# Patient Record
Sex: MALE | Race: OTHER | HISPANIC OR LATINO | URBAN - METROPOLITAN AREA
[De-identification: names, ages, dates, MRNs, and addresses within clinical notes are randomized per-mention and may not be internally consistent; named-entity substitution may affect disease eponyms.]

---

## 2020-01-10 ENCOUNTER — EMERGENCY (EMERGENCY)
Facility: HOSPITAL | Age: 35
LOS: 1 days | Discharge: HOME | End: 2020-01-10
Attending: EMERGENCY MEDICINE
Payer: MEDICAID

## 2020-01-10 VITALS
RESPIRATION RATE: 22 BRPM | DIASTOLIC BLOOD PRESSURE: 72 MMHG | WEIGHT: 315 LBS | HEIGHT: 69 IN | OXYGEN SATURATION: 91 % | TEMPERATURE: 99 F | SYSTOLIC BLOOD PRESSURE: 103 MMHG | HEART RATE: 106 BPM

## 2020-01-10 LAB
ALBUMIN SERPL ELPH-MCNC: 3.5 G/DL — SIGNIFICANT CHANGE UP (ref 3.5–5)
ALP SERPL-CCNC: 64 U/L — SIGNIFICANT CHANGE UP (ref 40–120)
ALT FLD-CCNC: 40 U/L DA — SIGNIFICANT CHANGE UP (ref 10–60)
ANION GAP SERPL CALC-SCNC: 7 MMOL/L — SIGNIFICANT CHANGE UP (ref 5–17)
APPEARANCE UR: CLEAR — SIGNIFICANT CHANGE UP
AST SERPL-CCNC: 65 U/L — HIGH (ref 10–40)
BASOPHILS # BLD AUTO: 0 K/UL — SIGNIFICANT CHANGE UP (ref 0–0.2)
BASOPHILS NFR BLD AUTO: 0 % — SIGNIFICANT CHANGE UP (ref 0–2)
BILIRUB SERPL-MCNC: 0.3 MG/DL — SIGNIFICANT CHANGE UP (ref 0.2–1.2)
BILIRUB UR-MCNC: NEGATIVE — SIGNIFICANT CHANGE UP
BUN SERPL-MCNC: 15 MG/DL — SIGNIFICANT CHANGE UP (ref 7–18)
CALCIUM SERPL-MCNC: 8.9 MG/DL — SIGNIFICANT CHANGE UP (ref 8.4–10.5)
CHLORIDE SERPL-SCNC: 105 MMOL/L — SIGNIFICANT CHANGE UP (ref 96–108)
CO2 SERPL-SCNC: 24 MMOL/L — SIGNIFICANT CHANGE UP (ref 22–31)
COLOR SPEC: YELLOW — SIGNIFICANT CHANGE UP
CREAT SERPL-MCNC: 1.13 MG/DL — SIGNIFICANT CHANGE UP (ref 0.5–1.3)
DIFF PNL FLD: ABNORMAL
EOSINOPHIL # BLD AUTO: 0 K/UL — SIGNIFICANT CHANGE UP (ref 0–0.5)
EOSINOPHIL NFR BLD AUTO: 0 % — SIGNIFICANT CHANGE UP (ref 0–6)
GLUCOSE SERPL-MCNC: 117 MG/DL — HIGH (ref 70–99)
GLUCOSE UR QL: NEGATIVE — SIGNIFICANT CHANGE UP
HCT VFR BLD CALC: 49.1 % — SIGNIFICANT CHANGE UP (ref 39–50)
HGB BLD-MCNC: 16.1 G/DL — SIGNIFICANT CHANGE UP (ref 13–17)
KETONES UR-MCNC: ABNORMAL
LEUKOCYTE ESTERASE UR-ACNC: ABNORMAL
LIDOCAIN IGE QN: 129 U/L — SIGNIFICANT CHANGE UP (ref 73–393)
LYMPHOCYTES # BLD AUTO: 0.71 K/UL — LOW (ref 1–3.3)
LYMPHOCYTES # BLD AUTO: 6 % — LOW (ref 13–44)
MCHC RBC-ENTMCNC: 29.1 PG — SIGNIFICANT CHANGE UP (ref 27–34)
MCHC RBC-ENTMCNC: 32.8 GM/DL — SIGNIFICANT CHANGE UP (ref 32–36)
MCV RBC AUTO: 88.8 FL — SIGNIFICANT CHANGE UP (ref 80–100)
MONOCYTES # BLD AUTO: 0.47 K/UL — SIGNIFICANT CHANGE UP (ref 0–0.9)
MONOCYTES NFR BLD AUTO: 4 % — SIGNIFICANT CHANGE UP (ref 2–14)
NEUTROPHILS # BLD AUTO: 10.62 K/UL — HIGH (ref 1.8–7.4)
NEUTROPHILS NFR BLD AUTO: 85 % — HIGH (ref 43–77)
NITRITE UR-MCNC: NEGATIVE — SIGNIFICANT CHANGE UP
PH UR: 6.5 — SIGNIFICANT CHANGE UP (ref 5–8)
PLATELET # BLD AUTO: 264 K/UL — SIGNIFICANT CHANGE UP (ref 150–400)
POTASSIUM SERPL-MCNC: 4.1 MMOL/L — SIGNIFICANT CHANGE UP (ref 3.5–5.3)
POTASSIUM SERPL-SCNC: 4.1 MMOL/L — SIGNIFICANT CHANGE UP (ref 3.5–5.3)
PROT SERPL-MCNC: 8.5 G/DL — HIGH (ref 6–8.3)
PROT UR-MCNC: 100
RBC # BLD: 5.53 M/UL — SIGNIFICANT CHANGE UP (ref 4.2–5.8)
RBC # FLD: 14.7 % — HIGH (ref 10.3–14.5)
SODIUM SERPL-SCNC: 136 MMOL/L — SIGNIFICANT CHANGE UP (ref 135–145)
SP GR SPEC: 1.01 — SIGNIFICANT CHANGE UP (ref 1.01–1.02)
UROBILINOGEN FLD QL: NEGATIVE — SIGNIFICANT CHANGE UP
WBC # BLD: 11.8 K/UL — HIGH (ref 3.8–10.5)
WBC # FLD AUTO: 11.8 K/UL — HIGH (ref 3.8–10.5)

## 2020-01-10 PROCEDURE — 96374 THER/PROPH/DIAG INJ IV PUSH: CPT | Mod: XU

## 2020-01-10 PROCEDURE — 74177 CT ABD & PELVIS W/CONTRAST: CPT

## 2020-01-10 PROCEDURE — 99284 EMERGENCY DEPT VISIT MOD MDM: CPT

## 2020-01-10 PROCEDURE — 71046 X-RAY EXAM CHEST 2 VIEWS: CPT | Mod: 26

## 2020-01-10 PROCEDURE — 81001 URINALYSIS AUTO W/SCOPE: CPT

## 2020-01-10 PROCEDURE — 80053 COMPREHEN METABOLIC PANEL: CPT

## 2020-01-10 PROCEDURE — 71046 X-RAY EXAM CHEST 2 VIEWS: CPT

## 2020-01-10 PROCEDURE — 74177 CT ABD & PELVIS W/CONTRAST: CPT | Mod: 26

## 2020-01-10 PROCEDURE — 96375 TX/PRO/DX INJ NEW DRUG ADDON: CPT

## 2020-01-10 PROCEDURE — 85027 COMPLETE CBC AUTOMATED: CPT

## 2020-01-10 PROCEDURE — 99284 EMERGENCY DEPT VISIT MOD MDM: CPT | Mod: 25

## 2020-01-10 PROCEDURE — 83690 ASSAY OF LIPASE: CPT

## 2020-01-10 PROCEDURE — 36415 COLL VENOUS BLD VENIPUNCTURE: CPT

## 2020-01-10 RX ORDER — ONDANSETRON 8 MG/1
4 TABLET, FILM COATED ORAL ONCE
Refills: 0 | Status: COMPLETED | OUTPATIENT
Start: 2020-01-10 | End: 2020-01-10

## 2020-01-10 RX ORDER — SODIUM CHLORIDE 9 MG/ML
1000 INJECTION INTRAMUSCULAR; INTRAVENOUS; SUBCUTANEOUS ONCE
Refills: 0 | Status: COMPLETED | OUTPATIENT
Start: 2020-01-10 | End: 2020-01-10

## 2020-01-10 RX ADMIN — ONDANSETRON 4 MILLIGRAM(S): 8 TABLET, FILM COATED ORAL at 13:14

## 2020-01-10 RX ADMIN — SODIUM CHLORIDE 1000 MILLILITER(S): 9 INJECTION INTRAMUSCULAR; INTRAVENOUS; SUBCUTANEOUS at 13:15

## 2020-01-10 NOTE — ED ADULT NURSE NOTE - OBJECTIVE STATEMENT
Patient came to the ED a/o x 3 ambulates c/o nausea with diarrhea. Pt was recently dx and treated with flu.

## 2020-01-10 NOTE — ED PROVIDER NOTE - PATIENT PORTAL LINK FT
You can access the FollowMyHealth Patient Portal offered by [facility] by registering at the following website: [email or web address]. By joining [employer]’s FollowMyHealth portal, you will also be able to view your health information using other applications (apps) compatible with our system.

## 2020-01-10 NOTE — ED ADULT TRIAGE NOTE - CHIEF COMPLAINT QUOTE
Fever, nausea, diarrhea x Sunday. Seen by PMD, on Antibiotics, but not feeling better. Fever, nausea, diarrhea x Sunday. Seen by PMD, but not feeling better.

## 2020-01-10 NOTE — ED ADULT TRIAGE NOTE - ESI TRIAGE ACUITY LEVEL, MLM
CC'd Chart Routing History     Routing History     From: Masha Do MD On: 06/10/2019 11:02 AM  To: Ernestina MCKAY Staff ([place])  Priority: Routine  Routing Comments:  LOGAN pt has a few IVIG doses schedule at home over next month, last dose is planned July 24th; after that, would like to plan for IVIG every 28 day (4 weeks) dose of 1 gram /KG total dose monthly for next 12 months;  Bioscripts; thanks         2

## 2020-01-10 NOTE — ED PROVIDER NOTE - OBJECTIVE STATEMENT
Pt is a 34y M with no significant PMHx and no significant PSHx presenting to the ED with fever, nausea and diarrhea. Pt reports symptoms first began with fever and cough. He went to see his pmd on 1/7 and was prescribed Tamiflu along with other medication for supportive care. Pt states over the past 2 days, symptoms have worsened thus coming to the ED for further evaluation. Pt notes persistent fever and now additionally has nausea and diarrhea. Pt denies any vomiting, urinary symptoms or any bloody diarrhea. Pt has NKDA and currently denies any additional complaints at this time.

## 2020-01-10 NOTE — ED PROVIDER NOTE - CLINICAL SUMMARY MEDICAL DECISION MAKING FREE TEXT BOX
Pt being treated Tamiflu with flu like symptoms now with nausea and diarrhea. R/o colitis, provide supportive care and reassess.

## 2021-11-02 NOTE — ED ADULT NURSE NOTE - NSFALLRSKASSESSTYPE_ED_ALL_ED
[facility] Employee Diabetes Program    Hugo Martinez is a 65 y.o. male enrolled in the [facility] Employee Diabetes Program. The goal of this voluntary program is to help employees and covered dependents reach their health maintenance goals in regards to their diabetes diagnosis. According to our records, patient is missing the following requirement(s) that must be completed by December 31, 2021 to avoid discharge from the program:     Second diabetes visit with your physician in 2021   First AND Second A1c result in 2021   Lipid panel   Urine protein/microalbumin   Pneumococcal vaccination   Influenza vaccination for the 2021    Plan:  Attempt made to reach patient by telephone to review above.  Left voice message for patient to return clinician's phone call to [phone], option 3. letter    Warner Ramirez, PharmD, [facility] [address] Pharmacist  Department: [phone]  ===================================  For Pharmacy Admin Tracking Only     Time Spent (min): 10
Initial (On Arrival)

## 2021-11-09 PROBLEM — Z00.00 ENCOUNTER FOR PREVENTIVE HEALTH EXAMINATION: Status: ACTIVE | Noted: 2021-11-09

## 2021-11-10 ENCOUNTER — APPOINTMENT (OUTPATIENT)
Dept: SURGERY | Facility: CLINIC | Age: 36
End: 2021-11-10
Payer: MEDICAID

## 2021-11-10 VITALS — TEMPERATURE: 96.8 F

## 2021-11-10 VITALS
WEIGHT: 315 LBS | HEART RATE: 93 BPM | SYSTOLIC BLOOD PRESSURE: 147 MMHG | DIASTOLIC BLOOD PRESSURE: 84 MMHG | BODY MASS INDEX: 46.65 KG/M2 | HEIGHT: 69 IN

## 2021-11-10 DIAGNOSIS — G47.30 SLEEP APNEA, UNSPECIFIED: ICD-10-CM

## 2021-11-10 DIAGNOSIS — F32.A DEPRESSION, UNSPECIFIED: ICD-10-CM

## 2021-11-10 DIAGNOSIS — R73.03 PREDIABETES.: ICD-10-CM

## 2021-11-10 DIAGNOSIS — J45.909 UNSPECIFIED ASTHMA, UNCOMPLICATED: ICD-10-CM

## 2021-11-10 DIAGNOSIS — F41.9 ANXIETY DISORDER, UNSPECIFIED: ICD-10-CM

## 2021-11-10 DIAGNOSIS — Z78.9 OTHER SPECIFIED HEALTH STATUS: ICD-10-CM

## 2021-11-10 PROCEDURE — 99204 OFFICE O/P NEW MOD 45 MIN: CPT

## 2021-11-10 NOTE — HISTORY OF PRESENT ILLNESS
[de-identified] : Mr. SHAUNA SILVA  presents today  for bariatric surgery consultation. he has a long-standing history of severe obesity refractory to multiple prior attempts at weight loss including conservative treatments of diet and exercise.  Mr. SHAUNA SILVA has been obese since childhood, and the most weight that he  has been able to lose by any means is negligible.   Previous weight loss efforts include: Calorie-counting, restricted intake. Mr. SILVA has limited capacity for exercise due to excess weight.  he states that he was on the diet between 2016 and 2019 and was able to go down to 298 lbs but with COVID pandemic he regained his weigh kali. he states that he initiated the process with another bariatric  surgeon 2 years ago but had issues with his medical insurance and could not have the surgery.  he  feels that weight loss surgery is the only option at this point for effective and clinically meaningful weight loss. Patient states that he has sleep apnea . he denies being evaluated  or having sleep study. he  is not sure what procedure he wants  yet.

## 2021-11-10 NOTE — PLAN
[FreeTextEntry1] : \par I have had a lengthy conversation with the patient and have discussed my impression and treatment plan with the patient.  \par The risks, benefits and alternatives, including laparoscopic gastric bypass, and laparoscopic vertical sleeve gastrectomy, were discussed at length and all of his questions were answered. The patient appears to understand and wishes to proceed.\par \par The patient was given the following instructions:\par \par 1.	Patient needs a complete medical evaluation including echocardiogram, stress test, pulmonary function test and evaluation for sleep apnea.\par \par 2.	Patient needs evaluation by GI including an upper endoscopy.\par \par 3.	Patient needs nutritional and psychological evaluations.\par \par 4.	Patient must attend a preoperative information meeting.\par \par 5.	Patient must undergo a right upper quadrant ultrasound, if there is no history of prior cholecystectomy.\par \par ·	Screening for Obstructive Sleep apnea (performed today).  Patient does meet criteria for polysomnography testing\par ·	Functional Assessment: Completely independent\par ·	Smoking:  Patient does not smoke.  Smoking cessation counseling/resources discussed (if indicated)\par ·	Substance Abuse:  Patient does not report use of illicit substances.  Counseling/resources discussed (if indicated)\par \par \par The weight loss surgery information packet as well as the nutrition education packet have been reviewed and given to the patient, and I provided and reviewed detailed documents addressing these same topics. I have provided literature about the procedures and encouraged the patient to further research the surgeries, and patient feels well informed.   I also provided patient with detailed information regarding the pre-operative requirements with respect to testing, medical, nutritional and psychological evaluations and documentation of same.  \par \par Also discussed was importance of taking supplements and attending regular follow-up.  I reviewed importance of behavioral modification and follow-up in order to optimize outcomes and avoid complications. The patient is aware of the expected length of stay and discharge plan for a sleeve gastrectomy.  I encouraged the patient to maintain a diet and exercise program to promote optimum health for the surgical procedure and post-op course. \par \par The patient clearly understood that surgery would only be scheduled if there are no medical or psychiatric contraindications and that follow up pre-operative office visits are required.  Patient agrees to begin a multi-disciplinary evaluation as discussed and provide required documentation and progress.  I informed patient that once all testing and evaluations (as deemed necessary) are completed, reviewed, and documentation received, this information to justify medical necessity for weight loss surgery will be submitted to insurance for pre-certification.  \par \par Patient will begin the pre-operative process with a visit to PCP, for whom detailed information regarding the necessary evaluations and documentation of obesity-related medical care was given to patient to share with PCP.  \par \par The patient had the opportunity to ask pertinent questions which were answered.  All of patient’s questions and concerns were addressed to patient’s satisfaction, and patient verbalized an understanding of the information discussed.\par

## 2021-11-10 NOTE — REVIEW OF SYSTEMS
[Recent Change In Weight] : ~T recent weight change [Negative] : Allergic/Immunologic [Patient Intake Form Reviewed] : Patient intake form was reviewed [SOB on Exertion] : shortness of breath during exertion [Fever] : no fever [Chills] : no chills [Fatigue] : no fatigue [Vision Problems] : no vision problems [Dysphagia] : no dysphagia [Odynophagia] : no odynophagia [Chest Pain] : no chest pain [Palpitations] : no palpitations [Shortness Of Breath] : no shortness of breath [Wheezing] : no wheezing [Cough] : no cough [Abdominal Pain] : no abdominal pain [Vomiting] : no vomiting [Constipation] : no constipation [Reflux/Heartburn] : no reflex/heartburn [Easy Bleeding] : no tendency for easy bleeding [Easy Bruising] : no tendency for easy bruising

## 2021-11-10 NOTE — PHYSICAL EXAM
[Obese, well nourished, in no acute distress] : obese, well nourished, in no acute distress [Normal] : affect appropriate [de-identified] : Obese, protuberant. Soft, nontender, nondistended, no rebound or guarding. [de-identified] : skin rash on forehead

## 2021-11-10 NOTE — CONSULT LETTER
[Dear  ___] : Dear  [unfilled], [Consult Letter:] : I had the pleasure of evaluating your patient, [unfilled]. [Please see my note below.] : Please see my note below. [Consult Closing:] : Thank you very much for allowing me to participate in the care of this patient.  If you have any questions, please do not hesitate to contact me. [Sincerely,] : Sincerely, [FreeTextEntry3] : Daniel

## 2021-11-29 ENCOUNTER — APPOINTMENT (OUTPATIENT)
Dept: GASTROENTEROLOGY | Facility: CLINIC | Age: 36
End: 2021-11-29

## 2021-12-28 NOTE — ASSESSMENT
[FreeTextEntry1] : \par Patient with a BMI of * which places patient in the morbidly obese category.  Patient also suffers from ***  .  This has directly contributed to patient's  current medical conditions as well as, a decreased quality of life.  Patient has very little chance of successfully losing and maintaining a significant amount of weight with non-surgical management, and would benefit from surgical intervention.  Patient meets the criteria for weight loss surgery as defined in the NIH consensus statement, surgery is medically necessary. \par Given patient’s current BMI and obesity-related comorbidities, I feel the patient is a candidate for and would benefit from weight loss surgery, as all prior attempts by non-surgical means have been futile.\par \par VTE Risk Calculation:\par \par Does patient have PERSONAL history of VTE? n\par Does patient have PERSONAL history of myocardial infarction, cardiac stent, or acute coronary syndrome? n\par \par Does patient have FAMILY history of VTE? n\par Does patient have FAMILY history of myocardial infarction, cardiac stent, or acute coronary syndrome? n\par \par \par Screening for Sleep Apnea:\par Nightly snoring:  y\par Witnessed apnea:   ?\par Nocturnal gasping:  ?\par Morning dry mouth:  y\par Feeling unrefreshed on awakening from sleep:  y\par Excessive daytime sleepiness:  y\par \par . 
Statement Selected

## 2022-01-11 ENCOUNTER — APPOINTMENT (OUTPATIENT)
Dept: CARDIOLOGY | Facility: CLINIC | Age: 37
End: 2022-01-11
Payer: MEDICAID

## 2022-01-11 VITALS
BODY MASS INDEX: 46.65 KG/M2 | HEIGHT: 69 IN | SYSTOLIC BLOOD PRESSURE: 137 MMHG | WEIGHT: 315 LBS | DIASTOLIC BLOOD PRESSURE: 82 MMHG | TEMPERATURE: 97.9 F | OXYGEN SATURATION: 87 % | HEART RATE: 99 BPM

## 2022-01-11 DIAGNOSIS — Z72.0 TOBACCO USE: ICD-10-CM

## 2022-01-11 PROCEDURE — 99214 OFFICE O/P EST MOD 30 MIN: CPT

## 2022-01-11 PROCEDURE — 99204 OFFICE O/P NEW MOD 45 MIN: CPT

## 2022-01-11 PROCEDURE — 93000 ELECTROCARDIOGRAM COMPLETE: CPT | Mod: NC

## 2022-01-12 PROBLEM — Z72.0 CURRENT OCCASIONAL SMOKER: Status: RESOLVED | Noted: 2021-11-10 | Resolved: 2022-01-12

## 2022-01-12 NOTE — PHYSICAL EXAM
[No Acute Distress] : no acute distress [Obese] : obese [Normal Conjunctiva] : normal conjunctiva [No Carotid Bruit] : no carotid bruit [Normal S1, S2] : normal S1, S2 [No Murmur] : no murmur [No Rub] : no rub [Clear Lung Fields] : clear lung fields [Good Air Entry] : good air entry [No Respiratory Distress] : no respiratory distress  [Soft] : abdomen soft [Non Tender] : non-tender [No Masses/organomegaly] : no masses/organomegaly [Normal Bowel Sounds] : normal bowel sounds [No Cyanosis] : no cyanosis [No Clubbing] : no clubbing [Edema ___] : edema [unfilled] [No Rash] : no rash [Moves all extremities] : moves all extremities [No Focal Deficits] : no focal deficits [Normal Speech] : normal speech [Alert and Oriented] : alert and oriented [de-identified] : Distant Heart sounds

## 2022-01-12 NOTE — HISTORY OF PRESENT ILLNESS
[FreeTextEntry1] : Patient is a 35 y/o male with Asthma, TAL (pending sleep study), COVID (2019, 2020), and no prior surgical history who presented to the clinic referred from bariatric surgery for pre-operative risk assessment. He reports that he has struggled with his weight for many years and now has weighed around 440lbs for the past year. He has exertional shortness of breath upon walking < 1 block and has to rest; he endorses that > 1 flight of stairs also causes shortness of breath. He denies any exertional or resting chest pain, PND, orthopnea, or leg swelling extending beyond his ankles. He does snore loudly, and has frequent morning headaches (STOP-BANG 6/8). He drinks about 2-3 alcoholic beverages/ month, and was a social smoker (<5 pack years, quit 6 months ago).

## 2022-01-12 NOTE — END OF VISIT
[] : Resident [FreeTextEntry3] : 37 yo obese M former < 5PY smoker with no other pertinent cardiac risk factors who presents for preprocedural evaluation prior to bariatric surgery. Patient is euvolemic on exam, with exertional dyspnea that seems to correlate with his weight gain. His EKG is fairly unremarkable. \par \par 1. Preprocedural evaluation: Risk calculators as per above. Patient is at low-intermediate risk of  adverse perioperative cardiac events undergoing intermediate risk surgery. No further cardiac testing is needed prior to patient's surgery.\par  \par 2. HTN: Borderline BP readings, expect numbers to normalize after bariatric surgery\par -If BP does not improve afterwards, will then pursue echocardiogram to assess for LVH and consideration of pharmacologic therapy as indicated.\par \par 3. HLD: Will assess lipid panel after bariatric surgery to assess ASCVD risk score

## 2022-01-12 NOTE — REVIEW OF SYSTEMS
[Headache] : headache [Weight Gain (___ Lbs)] : [unfilled] ~Ulb weight gain [Feeling Fatigued] : feeling fatigued [SOB] : shortness of breath [Dyspnea on exertion] : dyspnea during exertion [Lower Ext Edema] : lower extremity edema [Snoring] : snoring [Depression] : depression [Anxiety] : anxiety [Fever] : no fever [Chest Discomfort] : no chest discomfort [Leg Claudication] : no intermittent leg claudication [Palpitations] : no palpitations [Orthopnea] : no orthopnea [PND] : no PND [Syncope] : no syncope [Cough] : no cough [Wheezing] : no wheezing [Coughing Up Blood] : no hemoptysis [Dizziness] : no dizziness [Tremor] : no tremor was seen [Tingling (Paresthesia)] : no tingling [Weakness] : no weakness [Limb Weakness (Paresis)] : no limb weakness (Paresis) [Confusion] : no confusion was observed [Suicidal] : not suicidal

## 2022-01-12 NOTE — ASSESSMENT
[FreeTextEntry1] : Patient is a 35 y/o male with Asthma, TAL (pending sleep study), COVID (2019, 2020), and no prior surgical history who presented to the clinic referred from bariatric surgery for pre-operative risk assessment.\par \par RCRI score 0 (Class 1 risk: 3.9 % 30 day risk of death, MI, or cardiac arrest\par Seymour Score < 0.1% risk of perioperative/ 30 day MACE \par EKG NSR with RAD, no signs of AS, CHF, Afib)\par Patient is low to intermediate risk for the slated procedure \par Currently optimized, no further cardiology testing is required at this time \par \par 1. Stage I Hypertension\par BP in office 137/82 mmHg \par Patient is morbidly obese and HTN will likely improve s/p  bariatric procedure\par Would follow up post operatively and possibly start treatment if not improved \par Counseled on lifestyle, and diet changes \par

## 2022-01-18 ENCOUNTER — APPOINTMENT (OUTPATIENT)
Dept: GASTROENTEROLOGY | Facility: CLINIC | Age: 37
End: 2022-01-18
Payer: MEDICAID

## 2022-01-18 VITALS
HEIGHT: 69 IN | OXYGEN SATURATION: 93 % | WEIGHT: 315 LBS | SYSTOLIC BLOOD PRESSURE: 147 MMHG | TEMPERATURE: 97.9 F | DIASTOLIC BLOOD PRESSURE: 86 MMHG | BODY MASS INDEX: 46.65 KG/M2 | HEART RATE: 103 BPM

## 2022-01-18 DIAGNOSIS — Z01.818 ENCOUNTER FOR OTHER PREPROCEDURAL EXAMINATION: ICD-10-CM

## 2022-01-18 PROCEDURE — 99203 OFFICE O/P NEW LOW 30 MIN: CPT

## 2022-01-18 PROCEDURE — 99213 OFFICE O/P EST LOW 20 MIN: CPT

## 2022-01-19 NOTE — PHYSICAL EXAM
[General Appearance - Alert] : alert [General Appearance - In No Acute Distress] : in no acute distress [Bowel Sounds] : normal bowel sounds [Abdomen Soft] : soft [Abdomen Tenderness] : non-tender [Abdomen Mass (___ Cm)] : no abdominal mass palpated [Abnormal Walk] : normal gait [Nail Clubbing] : no clubbing  or cyanosis of the fingernails [Musculoskeletal - Swelling] : no joint swelling seen [Motor Tone] : muscle strength and tone were normal [Skin Color & Pigmentation] : normal skin color and pigmentation [Skin Turgor] : normal skin turgor [] : no rash [Deep Tendon Reflexes (DTR)] : deep tendon reflexes were 2+ and symmetric [Sensation] : the sensory exam was normal to light touch and pinprick [No Focal Deficits] : no focal deficits [Oriented To Time, Place, And Person] : oriented to person, place, and time [Impaired Insight] : insight and judgment were intact [Affect] : the affect was normal [FreeTextEntry1] : Large neck circumference  [Occult Blood Positive] : stool was negative for occult blood

## 2022-01-19 NOTE — HISTORY OF PRESENT ILLNESS
[de-identified] : This is a 36 year old male here for bariatric surgery clearance. PMH of asthma, TAL, morbid obesity and pre-DM. No known family history of GI issues or cancer. Currently does not work. Denies difficulty swallowing or acid reflux. Not on any medication except asthma inhaler. Bowel movement reular. Normal stool caliber. No blood in stool.In the office patient SaO2 using finger was 88%, placed pulse oximeter on the ear and it was btw 93-96% at rest. \par BMI 64.09\par Wt 434

## 2022-01-19 NOTE — ASSESSMENT
[FreeTextEntry1] : This is a 36 year old male here for bariatric surgery clearance. PMH of asthma, TAL, morbid obesity and pre-DM. No known family history of GI issues or cancer. Currently does not work. Denies difficulty swallowing or acid reflux. Not on any medication except asthma inhaler. Bowel movement regular. Normal stool caliber. No blood in stool.In the office patient SaO2 using finger was 88%, placed pulse oximeter on the ear and it was btw 93-96% at rest. \par \par On examination patient has a large neck circumference. Also, due to history of asthma and sever obesity patient wound need PST clearance. Patient may also, require intubation for airway protection for the EGD\par \par My plan\par -EGD likely with general anesthesia\par -covid swab\par -PST clearance

## 2022-02-24 ENCOUNTER — OUTPATIENT (OUTPATIENT)
Dept: OUTPATIENT SERVICES | Facility: HOSPITAL | Age: 37
LOS: 1 days | End: 2022-02-24
Payer: MEDICAID

## 2022-02-24 VITALS
HEART RATE: 88 BPM | DIASTOLIC BLOOD PRESSURE: 71 MMHG | HEIGHT: 68 IN | TEMPERATURE: 99 F | WEIGHT: 315 LBS | RESPIRATION RATE: 20 BRPM | OXYGEN SATURATION: 94 % | SYSTOLIC BLOOD PRESSURE: 126 MMHG

## 2022-02-24 DIAGNOSIS — Z29.9 ENCOUNTER FOR PROPHYLACTIC MEASURES, UNSPECIFIED: ICD-10-CM

## 2022-02-24 DIAGNOSIS — E66.01 MORBID (SEVERE) OBESITY DUE TO EXCESS CALORIES: ICD-10-CM

## 2022-02-24 DIAGNOSIS — Z01.818 ENCOUNTER FOR OTHER PREPROCEDURAL EXAMINATION: ICD-10-CM

## 2022-02-24 DIAGNOSIS — G47.33 OBSTRUCTIVE SLEEP APNEA (ADULT) (PEDIATRIC): ICD-10-CM

## 2022-02-24 DIAGNOSIS — J45.909 UNSPECIFIED ASTHMA, UNCOMPLICATED: ICD-10-CM

## 2022-02-24 LAB
ANION GAP SERPL CALC-SCNC: 5 MMOL/L — SIGNIFICANT CHANGE UP (ref 5–17)
BUN SERPL-MCNC: 14 MG/DL — SIGNIFICANT CHANGE UP (ref 7–18)
CALCIUM SERPL-MCNC: 9.3 MG/DL — SIGNIFICANT CHANGE UP (ref 8.4–10.5)
CHLORIDE SERPL-SCNC: 100 MMOL/L — SIGNIFICANT CHANGE UP (ref 96–108)
CO2 SERPL-SCNC: 32 MMOL/L — HIGH (ref 22–31)
CREAT SERPL-MCNC: 0.77 MG/DL — SIGNIFICANT CHANGE UP (ref 0.5–1.3)
GLUCOSE SERPL-MCNC: 140 MG/DL — HIGH (ref 70–99)
HCT VFR BLD CALC: 49.3 % — SIGNIFICANT CHANGE UP (ref 39–50)
HGB BLD-MCNC: 15.4 G/DL — SIGNIFICANT CHANGE UP (ref 13–17)
MCHC RBC-ENTMCNC: 28.2 PG — SIGNIFICANT CHANGE UP (ref 27–34)
MCHC RBC-ENTMCNC: 31.2 GM/DL — LOW (ref 32–36)
MCV RBC AUTO: 90.3 FL — SIGNIFICANT CHANGE UP (ref 80–100)
NRBC # BLD: 0 /100 WBCS — SIGNIFICANT CHANGE UP (ref 0–0)
PLATELET # BLD AUTO: 284 K/UL — SIGNIFICANT CHANGE UP (ref 150–400)
POTASSIUM SERPL-MCNC: 4.1 MMOL/L — SIGNIFICANT CHANGE UP (ref 3.5–5.3)
POTASSIUM SERPL-SCNC: 4.1 MMOL/L — SIGNIFICANT CHANGE UP (ref 3.5–5.3)
RBC # BLD: 5.46 M/UL — SIGNIFICANT CHANGE UP (ref 4.2–5.8)
RBC # FLD: 14.7 % — HIGH (ref 10.3–14.5)
SODIUM SERPL-SCNC: 137 MMOL/L — SIGNIFICANT CHANGE UP (ref 135–145)
WBC # BLD: 10.09 K/UL — SIGNIFICANT CHANGE UP (ref 3.8–10.5)
WBC # FLD AUTO: 10.09 K/UL — SIGNIFICANT CHANGE UP (ref 3.8–10.5)

## 2022-02-24 PROCEDURE — G0463: CPT

## 2022-02-24 PROCEDURE — 36415 COLL VENOUS BLD VENIPUNCTURE: CPT

## 2022-02-24 PROCEDURE — 80048 BASIC METABOLIC PNL TOTAL CA: CPT

## 2022-02-24 PROCEDURE — 85027 COMPLETE CBC AUTOMATED: CPT

## 2022-02-24 NOTE — H&P PST ADULT - PROBLEM SELECTOR PLAN 3
H/O asthma. Never been hospitalized or intubated  Noted patient with SOB when walking, talking  However, patient states he has been with SOB since after Covid in 2019  He advised to use his albuterol inhaler for SOB daily prn, and to go the nearest ER with trouble in breathing  He is also advised to bring the inhaler with him, the day of the procedure  Appointment with pulmonary 3/29/21  O2 saturation 94% in room air

## 2022-02-24 NOTE — H&P PST ADULT - PROBLEM SELECTOR PLAN 4
Instruction regarding chlorhexidine soap use is given.  Patient verbalized understanding.   Literature also given

## 2022-02-24 NOTE — H&P PST ADULT - RESPIRATORY AND THORAX COMMENTS
, Asthma, occasional spontaneous cough.   Patient states was told that he has TAL, but never had sleep study , Asthma, occasional, spontaneous cough.   Patient states was told that he has TAL, but never had sleep study

## 2022-02-24 NOTE — H&P PST ADULT - HISTORY OF PRESENT ILLNESS
37 yo male with history of Morbid Obesity, Covid -19, in 2019, reports the above.   Patient states that he is going to have a gastric bypass, in the near future, and this procedure is part of the w/u for that.  He is scheduled for : Esophagogastroduodenoscopy, on 2/28/22 37 yo male with history of Morbid Obesity, Covid -19, in 2019, Asthma, reports the above.   Patient states that he is going to have a gastric bypass, in the near future, and this procedure is part of the w/u for that.  He is scheduled for : Esophagogastroduodenoscopy, on 2/28/22

## 2022-02-24 NOTE — H&P PST ADULT - PROBLEM SELECTOR PLAN 2
STOP BANG : 5  High risk for TAL complications  Patient previously told that suffers from TAL. Never had sleep study.  TAL protocol before and after procedure  His appointment with pulmonary is 3/29/22

## 2022-02-24 NOTE — H&P PST ADULT - RS GEN PE MLT RESP DETAILS PC
clear to auscultation bilaterally/no chest wall tenderness respirations non-labored/clear to auscultation bilaterally/no chest wall tenderness

## 2022-02-24 NOTE — H&P PST ADULT - NSICDXPASTMEDICALHX_GEN_ALL_CORE_FT
PAST MEDICAL HISTORY:  2019 novel coronavirus disease (COVID-19) , 2019    Asthma     Morbid obesity     Pneumonia , in 2019

## 2022-02-24 NOTE — H&P PST ADULT - NSICDXFAMILYHX_GEN_ALL_CORE_FT
FAMILY HISTORY:  Grandparent  Still living? No  FH: prostate cancer, Age at diagnosis: Age Unknown

## 2022-03-02 LAB — SARS-COV-2 N GENE NPH QL NAA+PROBE: NOT DETECTED

## 2022-03-04 ENCOUNTER — RESULT REVIEW (OUTPATIENT)
Age: 37
End: 2022-03-04

## 2022-03-04 ENCOUNTER — APPOINTMENT (OUTPATIENT)
Dept: GASTROENTEROLOGY | Facility: HOSPITAL | Age: 37
End: 2022-03-04

## 2022-03-04 ENCOUNTER — OUTPATIENT (OUTPATIENT)
Dept: OUTPATIENT SERVICES | Facility: HOSPITAL | Age: 37
LOS: 1 days | End: 2022-03-04
Payer: MEDICAID

## 2022-03-04 DIAGNOSIS — E66.01 MORBID (SEVERE) OBESITY DUE TO EXCESS CALORIES: ICD-10-CM

## 2022-03-04 DIAGNOSIS — Z01.818 ENCOUNTER FOR OTHER PREPROCEDURAL EXAMINATION: ICD-10-CM

## 2022-03-04 PROCEDURE — 43239 EGD BIOPSY SINGLE/MULTIPLE: CPT

## 2022-03-04 PROCEDURE — 88305 TISSUE EXAM BY PATHOLOGIST: CPT

## 2022-03-04 PROCEDURE — 88312 SPECIAL STAINS GROUP 1: CPT | Mod: 26

## 2022-03-04 PROCEDURE — 88312 SPECIAL STAINS GROUP 1: CPT

## 2022-03-04 PROCEDURE — 88305 TISSUE EXAM BY PATHOLOGIST: CPT | Mod: 26

## 2022-03-04 DEVICE — CATH ESOPH DIL 9 ATM 6FR 8-10MM: Type: IMPLANTABLE DEVICE | Status: FUNCTIONAL

## 2022-03-04 DEVICE — CATH ESOPH DIL 8 ATM 6FR10-12M: Type: IMPLANTABLE DEVICE | Status: FUNCTIONAL

## 2022-03-04 DEVICE — CATH BALLOON DIL 6-8MM: Type: IMPLANTABLE DEVICE | Status: FUNCTIONAL

## 2022-03-08 LAB — SURGICAL PATHOLOGY STUDY: SIGNIFICANT CHANGE UP

## 2022-03-29 ENCOUNTER — APPOINTMENT (OUTPATIENT)
Dept: PULMONOLOGY | Facility: CLINIC | Age: 37
End: 2022-03-29
Payer: MEDICAID

## 2022-03-29 VITALS
BODY MASS INDEX: 19.99 KG/M2 | WEIGHT: 135 LBS | SYSTOLIC BLOOD PRESSURE: 147 MMHG | HEIGHT: 69 IN | HEART RATE: 89 BPM | DIASTOLIC BLOOD PRESSURE: 87 MMHG

## 2022-03-29 VITALS — TEMPERATURE: 96.8 F

## 2022-03-29 DIAGNOSIS — R06.83 SNORING: ICD-10-CM

## 2022-03-29 DIAGNOSIS — G47.19 OTHER HYPERSOMNIA: ICD-10-CM

## 2022-03-29 PROCEDURE — 99204 OFFICE O/P NEW MOD 45 MIN: CPT | Mod: 25

## 2022-03-29 PROCEDURE — 94010 BREATHING CAPACITY TEST: CPT

## 2022-03-29 PROCEDURE — ZZZZZ: CPT

## 2022-03-29 PROCEDURE — 94726 PLETHYSMOGRAPHY LUNG VOLUMES: CPT

## 2022-03-29 PROCEDURE — 94729 DIFFUSING CAPACITY: CPT

## 2022-04-12 ENCOUNTER — TRANSCRIPTION ENCOUNTER (OUTPATIENT)
Age: 37
End: 2022-04-12

## 2022-05-20 NOTE — HISTORY OF PRESENT ILLNESS
[< 20 pack-years] : < 20 pack-years [Never] : never [Former] : former [Awakes Unrefreshed] : awakes unrefreshed [Awakes with Headache] : awakes with headache [Daytime Somnolence] : daytime somnolence [Difficulty Maintaining Sleep] : difficulty maintaining sleep [Fatigue] : fatigue [Frequent Nocturnal Awakening] : frequent nocturnal awakening [Nonrestorative Sleep] : nonrestorative sleep [Snoring] : snoring [Tired while Driving] : tired while driving [Unintentional Sleep while Inactive] : unintentional sleep while inactive [Witnessed Apneas] : witnessed apneas [TextBox_4] : Patient is a 35 y/o male with TAL (pending sleep study), COVID (2019, February 2020), Asthma (post COVID, as per PCP) and no prior surgical history who presented to the clinic referred from bariatric surgery for pre-operative risk assessment. Patient currently complains of SOB on exertion causing him to use his inhalers, he is using the inhaler 3-4 times a day for the last 2-3 months. He also complains of frequent awakenings at night, and feeling tired in the morning, he states the wakes up around every 1-1.5 hrs throughout the night, he snores, and has been told that he stops breathing during the night. He is able to walk 1 block and 1 Flight of stairs w/o SOB. He also endorses cough at night. He is covid vaccinated, not flu vaccinated. He is following diet, but exercising has been limited due to SOB.  [TextBox_13] : 10 [YearQuit] : 2021 [TextBox_27] : high school [Difficulty Initiating Sleep] : does not have difficulty initiating sleep

## 2022-05-20 NOTE — REASON FOR VISIT
[Initial] : an initial visit [Asthma] : asthma [Pre-op Risk Stratification] : pre-op risk stratification [Obesity] : obesity [TextBox_44] : COVID

## 2022-05-20 NOTE — END OF VISIT
[] : Resident [Time Spent: ___ minutes] : I have spent [unfilled] minutes of time on the encounter. [>50% of the face to face encounter time was spent on counseling and/or coordination of care for ___] : Greater than 50% of the face to face encounter time was spent on counseling and/or coordination of care for [unfilled] [FreeTextEntry3] : Limited ET due to obesity and deconditioning.\par PFTs with restriction due to body habitus and obesity.\par CXR pending\par \par Pt reports significant snoring and daytime sleepiness, and should undergo a diagnostic home sleep study to assess for any evidence of TAL. If found to be moderate or severe, would recommend 2-3 weeks of appropriate CPAP treatment prior to any surgical intervention.\par

## 2022-05-20 NOTE — CONSULT LETTER
[Dear  ___] : Dear  [unfilled], [Consult Letter:] : I had the pleasure of evaluating your patient, [unfilled]. [Please see my note below.] : Please see my note below. [Consult Closing:] : Thank you very much for allowing me to participate in the care of this patient.  If you have any questions, please do not hesitate to contact me. [Sincerely,] : Sincerely, [FreeTextEntry3] : Lu Montes MD, [facility]

## 2022-05-20 NOTE — ASSESSMENT
[FreeTextEntry1] : Patient is a 37 y/o male with TAL (pending sleep study), COVID (2019, February 2020), Asthma (post COVID, as per PCP) and no prior surgical history who presented to the clinic referred from bariatric surgery for pre-operative risk assessment.\par \par 1. TAL: Patient with frequent nocturnal awakenings, loud snoring, witness apnea during the night, daytime somnolence, large neck circumference, STOP-BANG 5 points. Will give referral for Sleep study. \par 2. Restrictive lung disease 2/2 obesity: PFTs done in office showing Restrictive pattern due to his obesity. There is no need for inhalers at this time. \par 3. Bariatric surgery pre-op: patient with limited exercise tolerance due to being overweight, CALDERÓN. PFT reviewed, with restrictive pattern, Will obtain chest Xray, and sleep study. Will need follow up post sleep study, if sleep study consistent with TAL, will need to use Cpap for 3 weeks prior to surgery.

## 2022-05-20 NOTE — REVIEW OF SYSTEMS
[Cough] : cough [Chest Tightness] : chest tightness [Sputum] : sputum [Dyspnea] : dyspnea [Wheezing] : wheezing [A.M. Dry Mouth] : a.m. dry mouth [SOB on Exertion] : sob on exertion [Nocturia] : nocturia [Negative] : Endocrine [Hemoptysis] : no hemoptysis [Frequent URIs] : no frequent URIs

## 2022-06-24 PROBLEM — U07.1 COVID-19: Chronic | Status: ACTIVE | Noted: 2022-02-24

## 2022-06-24 PROBLEM — J18.9 PNEUMONIA, UNSPECIFIED ORGANISM: Chronic | Status: ACTIVE | Noted: 2022-02-24

## 2022-06-24 PROBLEM — J45.909 UNSPECIFIED ASTHMA, UNCOMPLICATED: Chronic | Status: ACTIVE | Noted: 2022-02-24

## 2022-06-24 PROBLEM — E66.01 MORBID (SEVERE) OBESITY DUE TO EXCESS CALORIES: Chronic | Status: ACTIVE | Noted: 2022-02-24

## 2022-06-26 ENCOUNTER — TRANSCRIPTION ENCOUNTER (OUTPATIENT)
Age: 37
End: 2022-06-26

## 2022-06-26 ENCOUNTER — INPATIENT (INPATIENT)
Facility: HOSPITAL | Age: 37
LOS: 0 days | Discharge: LEFT BEFORE DISCHARGE | DRG: 202 | End: 2022-06-26
Attending: INTERNAL MEDICINE | Admitting: INTERNAL MEDICINE
Payer: MEDICAID

## 2022-06-26 VITALS
HEART RATE: 80 BPM | DIASTOLIC BLOOD PRESSURE: 80 MMHG | SYSTOLIC BLOOD PRESSURE: 169 MMHG | OXYGEN SATURATION: 93 % | RESPIRATION RATE: 20 BRPM

## 2022-06-26 VITALS
OXYGEN SATURATION: 84 % | RESPIRATION RATE: 20 BRPM | WEIGHT: 315 LBS | HEART RATE: 106 BPM | TEMPERATURE: 99 F | SYSTOLIC BLOOD PRESSURE: 122 MMHG | DIASTOLIC BLOOD PRESSURE: 78 MMHG | HEIGHT: 68 IN

## 2022-06-26 DIAGNOSIS — G47.33 OBSTRUCTIVE SLEEP APNEA (ADULT) (PEDIATRIC): ICD-10-CM

## 2022-06-26 DIAGNOSIS — J98.4 OTHER DISORDERS OF LUNG: ICD-10-CM

## 2022-06-26 DIAGNOSIS — J96.01 ACUTE RESPIRATORY FAILURE WITH HYPOXIA: ICD-10-CM

## 2022-06-26 DIAGNOSIS — R09.02 HYPOXEMIA: ICD-10-CM

## 2022-06-26 DIAGNOSIS — Z29.9 ENCOUNTER FOR PROPHYLACTIC MEASURES, UNSPECIFIED: ICD-10-CM

## 2022-06-26 LAB
ALBUMIN SERPL ELPH-MCNC: 2.8 G/DL — LOW (ref 3.5–5)
ALP SERPL-CCNC: 64 U/L — SIGNIFICANT CHANGE UP (ref 40–120)
ALT FLD-CCNC: 32 U/L DA — SIGNIFICANT CHANGE UP (ref 10–60)
ANION GAP SERPL CALC-SCNC: 1 MMOL/L — LOW (ref 5–17)
AST SERPL-CCNC: 22 U/L — SIGNIFICANT CHANGE UP (ref 10–40)
BASE EXCESS BLDA CALC-SCNC: 9.7 MMOL/L — HIGH (ref -2–3)
BASOPHILS # BLD AUTO: 0.03 K/UL — SIGNIFICANT CHANGE UP (ref 0–0.2)
BASOPHILS NFR BLD AUTO: 0.3 % — SIGNIFICANT CHANGE UP (ref 0–2)
BILIRUB SERPL-MCNC: 0.1 MG/DL — LOW (ref 0.2–1.2)
BLOOD GAS COMMENTS ARTERIAL: SIGNIFICANT CHANGE UP
BUN SERPL-MCNC: 9 MG/DL — SIGNIFICANT CHANGE UP (ref 7–18)
CALCIUM SERPL-MCNC: 9.4 MG/DL — SIGNIFICANT CHANGE UP (ref 8.4–10.5)
CHLORIDE SERPL-SCNC: 102 MMOL/L — SIGNIFICANT CHANGE UP (ref 96–108)
CO2 SERPL-SCNC: 39 MMOL/L — HIGH (ref 22–31)
CREAT SERPL-MCNC: 1.01 MG/DL — SIGNIFICANT CHANGE UP (ref 0.5–1.3)
EGFR: 99 ML/MIN/1.73M2 — SIGNIFICANT CHANGE UP
EOSINOPHIL # BLD AUTO: 0.4 K/UL — SIGNIFICANT CHANGE UP (ref 0–0.5)
EOSINOPHIL NFR BLD AUTO: 4.7 % — SIGNIFICANT CHANGE UP (ref 0–6)
GLUCOSE SERPL-MCNC: 122 MG/DL — HIGH (ref 70–99)
HCO3 BLDA-SCNC: 38 MMOL/L — HIGH (ref 21–28)
HCT VFR BLD CALC: 46.3 % — SIGNIFICANT CHANGE UP (ref 39–50)
HGB BLD-MCNC: 14.3 G/DL — SIGNIFICANT CHANGE UP (ref 13–17)
HMPV RNA SPEC QL NAA+PROBE: DETECTED
HOROWITZ INDEX BLDA+IHG-RTO: 21 — SIGNIFICANT CHANGE UP
IMM GRANULOCYTES NFR BLD AUTO: 0.3 % — SIGNIFICANT CHANGE UP (ref 0–1.5)
LACTATE SERPL-SCNC: 0.9 MMOL/L — SIGNIFICANT CHANGE UP (ref 0.7–2)
LYMPHOCYTES # BLD AUTO: 1.2 K/UL — SIGNIFICANT CHANGE UP (ref 1–3.3)
LYMPHOCYTES # BLD AUTO: 14 % — SIGNIFICANT CHANGE UP (ref 13–44)
MAGNESIUM SERPL-MCNC: 2.5 MG/DL — SIGNIFICANT CHANGE UP (ref 1.6–2.6)
MCHC RBC-ENTMCNC: 28.4 PG — SIGNIFICANT CHANGE UP (ref 27–34)
MCHC RBC-ENTMCNC: 30.9 GM/DL — LOW (ref 32–36)
MCV RBC AUTO: 91.9 FL — SIGNIFICANT CHANGE UP (ref 80–100)
MONOCYTES # BLD AUTO: 1.09 K/UL — HIGH (ref 0–0.9)
MONOCYTES NFR BLD AUTO: 12.7 % — SIGNIFICANT CHANGE UP (ref 2–14)
NEUTROPHILS # BLD AUTO: 5.85 K/UL — SIGNIFICANT CHANGE UP (ref 1.8–7.4)
NEUTROPHILS NFR BLD AUTO: 68 % — SIGNIFICANT CHANGE UP (ref 43–77)
NRBC # BLD: 0 /100 WBCS — SIGNIFICANT CHANGE UP (ref 0–0)
PCO2 BLDA: 65 MMHG — HIGH (ref 35–48)
PH BLDA: 7.37 — SIGNIFICANT CHANGE UP (ref 7.35–7.45)
PLATELET # BLD AUTO: 238 K/UL — SIGNIFICANT CHANGE UP (ref 150–400)
PO2 BLDA: 58 MMHG — LOW (ref 83–108)
POTASSIUM SERPL-MCNC: 4.5 MMOL/L — SIGNIFICANT CHANGE UP (ref 3.5–5.3)
POTASSIUM SERPL-SCNC: 4.5 MMOL/L — SIGNIFICANT CHANGE UP (ref 3.5–5.3)
PROT SERPL-MCNC: 7.7 G/DL — SIGNIFICANT CHANGE UP (ref 6–8.3)
RAPID RVP RESULT: DETECTED
RBC # BLD: 5.04 M/UL — SIGNIFICANT CHANGE UP (ref 4.2–5.8)
RBC # FLD: 15.9 % — HIGH (ref 10.3–14.5)
SAO2 % BLDA: 88 % — SIGNIFICANT CHANGE UP
SARS-COV-2 RNA SPEC QL NAA+PROBE: SIGNIFICANT CHANGE UP
SODIUM SERPL-SCNC: 142 MMOL/L — SIGNIFICANT CHANGE UP (ref 135–145)
WBC # BLD: 8.6 K/UL — SIGNIFICANT CHANGE UP (ref 3.8–10.5)
WBC # FLD AUTO: 8.6 K/UL — SIGNIFICANT CHANGE UP (ref 3.8–10.5)

## 2022-06-26 PROCEDURE — 99291 CRITICAL CARE FIRST HOUR: CPT

## 2022-06-26 PROCEDURE — 99285 EMERGENCY DEPT VISIT HI MDM: CPT

## 2022-06-26 PROCEDURE — 71045 X-RAY EXAM CHEST 1 VIEW: CPT | Mod: 26

## 2022-06-26 PROCEDURE — 83735 ASSAY OF MAGNESIUM: CPT

## 2022-06-26 PROCEDURE — 36415 COLL VENOUS BLD VENIPUNCTURE: CPT

## 2022-06-26 PROCEDURE — 71045 X-RAY EXAM CHEST 1 VIEW: CPT

## 2022-06-26 PROCEDURE — 85025 COMPLETE CBC W/AUTO DIFF WBC: CPT

## 2022-06-26 PROCEDURE — 87040 BLOOD CULTURE FOR BACTERIA: CPT

## 2022-06-26 PROCEDURE — 93005 ELECTROCARDIOGRAM TRACING: CPT

## 2022-06-26 PROCEDURE — 83605 ASSAY OF LACTIC ACID: CPT

## 2022-06-26 PROCEDURE — 96375 TX/PRO/DX INJ NEW DRUG ADDON: CPT

## 2022-06-26 PROCEDURE — 94640 AIRWAY INHALATION TREATMENT: CPT

## 2022-06-26 PROCEDURE — 0225U NFCT DS DNA&RNA 21 SARSCOV2: CPT

## 2022-06-26 PROCEDURE — 80053 COMPREHEN METABOLIC PANEL: CPT

## 2022-06-26 PROCEDURE — 82803 BLOOD GASES ANY COMBINATION: CPT

## 2022-06-26 PROCEDURE — 96374 THER/PROPH/DIAG INJ IV PUSH: CPT

## 2022-06-26 RX ORDER — IBUPROFEN 200 MG
800 TABLET ORAL ONCE
Refills: 0 | Status: DISCONTINUED | OUTPATIENT
Start: 2022-06-26 | End: 2022-06-26

## 2022-06-26 RX ORDER — DEXTROSE 50 % IN WATER 50 %
15 SYRINGE (ML) INTRAVENOUS ONCE
Refills: 0 | Status: DISCONTINUED | OUTPATIENT
Start: 2022-06-26 | End: 2022-06-26

## 2022-06-26 RX ORDER — SODIUM CHLORIDE 9 MG/ML
1000 INJECTION, SOLUTION INTRAVENOUS
Refills: 0 | Status: DISCONTINUED | OUTPATIENT
Start: 2022-06-26 | End: 2022-06-26

## 2022-06-26 RX ORDER — INSULIN LISPRO 100/ML
VIAL (ML) SUBCUTANEOUS
Refills: 0 | Status: DISCONTINUED | OUTPATIENT
Start: 2022-06-26 | End: 2022-06-26

## 2022-06-26 RX ORDER — AZITHROMYCIN 500 MG/1
500 TABLET, FILM COATED ORAL ONCE
Refills: 0 | Status: COMPLETED | OUTPATIENT
Start: 2022-06-26 | End: 2022-06-26

## 2022-06-26 RX ORDER — IPRATROPIUM/ALBUTEROL SULFATE 18-103MCG
3 AEROSOL WITH ADAPTER (GRAM) INHALATION
Refills: 0 | Status: COMPLETED | OUTPATIENT
Start: 2022-06-26 | End: 2022-06-26

## 2022-06-26 RX ORDER — DEXTROSE 50 % IN WATER 50 %
25 SYRINGE (ML) INTRAVENOUS ONCE
Refills: 0 | Status: DISCONTINUED | OUTPATIENT
Start: 2022-06-26 | End: 2022-06-26

## 2022-06-26 RX ORDER — DEXTROSE 50 % IN WATER 50 %
12.5 SYRINGE (ML) INTRAVENOUS ONCE
Refills: 0 | Status: DISCONTINUED | OUTPATIENT
Start: 2022-06-26 | End: 2022-06-26

## 2022-06-26 RX ORDER — GLUCAGON INJECTION, SOLUTION 0.5 MG/.1ML
1 INJECTION, SOLUTION SUBCUTANEOUS ONCE
Refills: 0 | Status: DISCONTINUED | OUTPATIENT
Start: 2022-06-26 | End: 2022-06-26

## 2022-06-26 RX ORDER — CEFTRIAXONE 500 MG/1
1000 INJECTION, POWDER, FOR SOLUTION INTRAMUSCULAR; INTRAVENOUS ONCE
Refills: 0 | Status: COMPLETED | OUTPATIENT
Start: 2022-06-26 | End: 2022-06-26

## 2022-06-26 RX ORDER — ALBUTEROL 90 UG/1
2 AEROSOL, METERED ORAL
Qty: 0 | Refills: 0 | DISCHARGE

## 2022-06-26 RX ORDER — ACETAMINOPHEN 500 MG
975 TABLET ORAL ONCE
Refills: 0 | Status: COMPLETED | OUTPATIENT
Start: 2022-06-26 | End: 2022-06-26

## 2022-06-26 RX ORDER — ENOXAPARIN SODIUM 100 MG/ML
40 INJECTION SUBCUTANEOUS EVERY 12 HOURS
Refills: 0 | Status: DISCONTINUED | OUTPATIENT
Start: 2022-06-26 | End: 2022-06-26

## 2022-06-26 RX ORDER — METFORMIN HYDROCHLORIDE 850 MG/1
1 TABLET ORAL
Qty: 0 | Refills: 0 | DISCHARGE

## 2022-06-26 RX ADMIN — Medication 975 MILLIGRAM(S): at 16:49

## 2022-06-26 RX ADMIN — Medication 3 MILLILITER(S): at 16:32

## 2022-06-26 RX ADMIN — Medication 80 MILLIGRAM(S): at 16:00

## 2022-06-26 RX ADMIN — Medication 975 MILLIGRAM(S): at 16:06

## 2022-06-26 RX ADMIN — CEFTRIAXONE 100 MILLIGRAM(S): 500 INJECTION, POWDER, FOR SOLUTION INTRAMUSCULAR; INTRAVENOUS at 15:59

## 2022-06-26 RX ADMIN — AZITHROMYCIN 255 MILLIGRAM(S): 500 TABLET, FILM COATED ORAL at 16:00

## 2022-06-26 RX ADMIN — Medication 3 MILLILITER(S): at 16:49

## 2022-06-26 RX ADMIN — Medication 3 MILLILITER(S): at 16:02

## 2022-06-26 NOTE — H&P ADULT - PROBLEM SELECTOR PLAN 1
- h/o restrictive lung disease 2/2 morbid obesity and TAL  - p/w SOB  - RVP: hMPV positive  - AB.37|65|58|38  - CXR increased infiltrates b/l  - Well's criteria 1.5  - s/p duoneb, steroid, ceftriaxone and azithromycin  - s/p BiPAP  - oxygen supp  - droplet isolation  - primary team to obtain CTA if indicated  - f/u dimer, BNP

## 2022-06-26 NOTE — ED ADULT TRIAGE NOTE - WEIGHT IN KG
195 Rotation Flap Text: The defect edges were debeveled with a #15 scalpel blade.  Given the location of the defect, shape of the defect and the proximity to free margins a rotation flap was deemed most appropriate.  Using a sterile surgical marker, an appropriate rotation flap was drawn incorporating the defect and placing the expected incisions within the relaxed skin tension lines where possible.    The area thus outlined was incised deep to adipose tissue with a #15 scalpel blade.  The skin margins were undermined to an appropriate distance in all directions utilizing iris scissors.

## 2022-06-26 NOTE — DISCHARGE NOTE PROVIDER - CARE PROVIDER_API CALL
Daniel Woo)  Surgery  [phone] [place] [id number]  Phone: [phone]  Fax: [phone]  Follow Up Time: Routine

## 2022-06-26 NOTE — ED PROVIDER NOTE - CONSTITUTIONAL, MLM
Panel Management Review      Patient has the following on her problem list: None      Composite cancer screening  Chart review shows that this patient is due/due soon for the following Mammogram and Colonoscopy  Summary:    Patient is due/failing the following:   Dexa scan, COLONOSCOPY, LDL, MAMMOGRAM and PHYSICAL    Action needed:   Patient needs office visit for Medicare annual wellness with fasting labs. and Patient needs referral/order: Mammogram, Colonoscopy, and Dexa scan.       Type of outreach:    Sent letter.    Questions for provider review:    None                                                                                                                                    Portia Randolph MA                
- - -

## 2022-06-26 NOTE — H&P ADULT - HISTORY OF PRESENT ILLNESS
Patient is a 36 year-old morbidly obese male, w/ PMH of TAL and DM, presents with SOB. He reports developing cough and increased mucus production 4 days ago. He noticed difficulty of breathing after bouts of coughing while driving. He drove himself to the hospital. He endorses nausea. He denies fever, chills, vomiting, chest pain, abdominal pain, urinary or bowel movement changes.     Of note, ICU was consulted for AHRF and was rejected based on normal pH.

## 2022-06-26 NOTE — DISCHARGE NOTE NURSING/CASE MANAGEMENT/SOCIAL WORK - PATIENT PORTAL LINK FT
You can access the FollowMyHealth Patient Portal offered by [facility] by registering at the following website: [email or web address]. By joining [email or web address] FollowMyHealth portal, you will also be able to view your health information using other applications (apps) compatible with our system.

## 2022-06-26 NOTE — ED PROVIDER NOTE - CPE EDP EYES NORM
Telephone Encounter by Desiree Torres MA at 02/01/18 06:47 PM     Author:  Desiree Torres MA Service:  (none) Author Type:  Medical Assistant     Filed:  02/01/18 06:48 PM Encounter Date:  2/1/2018 Status:  Signed     :  Desiree Torres MA (Medical Assistant)            Refilled per protocol.[JN1.1T]          Revision History        User Key Date/Time User Provider Type Action    > JN1.1 02/01/18 06:48 PM Desiree Torres MA Medical Assistant Sign    T - Template             normal...

## 2022-06-26 NOTE — DISCHARGE NOTE NURSING/CASE MANAGEMENT/SOCIAL WORK - NSDCPEFALRISK_GEN_ALL_CORE
For information on Fall & Injury Prevention, visit: [email or web address] OR  [email or web address] OR  https://www.cdc.gov/steadi/patient.html

## 2022-06-26 NOTE — ED PROVIDER NOTE - PROGRESS NOTE DETAILS
pt with hypoxemia, PNA, asthma exac., will admit pt.  Spoke with ICU Dr. Bo, pt does not need BiPAP pH 7.37.  Case d/w Dr. Solorzano, will admit

## 2022-06-26 NOTE — ED ADULT NURSE NOTE - OBJECTIVE STATEMENT
pt is here for difficulty breathing.  pt stated that difficulty breathing with white sputum x 3-4 days, c/o cough, denied fever or chills, denied chest pain or palpitation,

## 2022-06-26 NOTE — ED PROVIDER NOTE - NSICDXPASTMEDICALHX_GEN_ALL_CORE_FT
PAST MEDICAL HISTORY:  2019 novel coronavirus disease (COVID-19) , 2019    Asthma     DM (diabetes mellitus)     Morbid obesity     Pneumonia , in 2019

## 2022-06-26 NOTE — H&P ADULT - CONSTITUTIONAL
Anesthetic History     Increased risk of difficult airway          Review of Systems / Medical History  Patient summary reviewed and pertinent labs reviewed    Pulmonary  Within defined limits              Comments: >20 years of tobacco abuse. None currently   Neuro/Psych   Within defined limits           Cardiovascular    Hypertension          CAD and cardiac stents (2014 BMS - Plavix held x 10d, remains on bASA)    Exercise tolerance: <4 METS  Comments: DIEGO off plavix 7 days--on 81 asa   GI/Hepatic/Renal     GERD (Asymptomatic): well controlled           Endo/Other    Diabetes: well controlled, type 2    Arthritis     Other Findings              Physical Exam    Airway  Mallampati: III  TM Distance: 4 - 6 cm  Neck ROM: normal range of motion   Mouth opening: Normal    Comments: Large front, capped teeth Cardiovascular    Rhythm: regular  Rate: normal         Dental    Dentition: Caps/crowns and Implants     Pulmonary  Breath sounds clear to auscultation               Abdominal  GI exam deferred       Other Findings            Anesthetic Plan    ASA: 2  Anesthesia type: spinal            Anesthetic plan and risks discussed with: Patient      Discussed general. Plan for LMA if convert to general. Multiple lower back procedures.  Post DIEGO after last knee replacement
obese

## 2022-06-26 NOTE — DISCHARGE NOTE PROVIDER - NSDCCPCAREPLAN_GEN_ALL_CORE_FT
PRINCIPAL DISCHARGE DIAGNOSIS  Diagnosis: Hypoxemia  Assessment and Plan of Treatment: You were admitted because you were experiencing shortness of breath, increased cough and mucus production likely related to restrictive disease. While in the ED, you required oxygen supplementation.   YOU DECIDED TO SIGN OUT AGAINST MEDICAL ADVICE. Please follow up with your PCP.      SECONDARY DISCHARGE DIAGNOSES  Diagnosis: Acute respiratory failure with hypoxia  Assessment and Plan of Treatment: You were admitted because you were experiencing shortness of breath, increased cough and mucus production likely related to restrictive disease. While in the ED, you required oxygen supplementation.   YOU DECIDED TO SIGN OUT AGAINST MEDICAL ADVICE. Please follow up with your PCP.    Diagnosis: Acute asthma exacerbation  Assessment and Plan of Treatment: You were admitted because you were experiencing shortness of breath, increased cough and mucus production likely related to restrictive disease. While in the ED, you required oxygen supplementation.   YOU DECIDED TO SIGN OUT AGAINST MEDICAL ADVICE. Please follow up with your PCP.

## 2022-06-26 NOTE — ED PROVIDER NOTE - CARE PLAN
Principal Discharge DX:	Hypoxemia  Secondary Diagnosis:	Acute asthma exacerbation  Secondary Diagnosis:	PNA (pneumonia)   1

## 2022-06-26 NOTE — H&P ADULT - ASSESSMENT
Patient is a 36 year-old morbidly obese male, w/ PMH of TAL and DM, presents with SOB. Admitted for AHRF

## 2022-06-26 NOTE — DISCHARGE NOTE PROVIDER - HOSPITAL COURSE
36 year-old morbidly obese male, w/ PMH of TAL and DM, presents with SOB. He reports developing cough and increased mucus production 4 days ago. He noticed difficulty of breathing after bouts of coughing while driving. He drove himself to the hospital. He endorses nausea. He denies fever, chills, vomiting, chest pain, abdominal pain, urinary or bowel movement changes.     Of note, ICU was consulted for AHRF and was rejected based on normal pH.   36 year-old morbidly obese male, w/ PMH of TAL and DM, presents with SOB. He reports developing cough and increased mucus production 4 days ago. He noticed difficulty of breathing after bouts of coughing while driving. He drove himself to the hospital. He endorses nausea. He denies fever, chills, vomiting, chest pain, abdominal pain, urinary or bowel movement changes.     Of note, ICU was consulted for AHRF and was rejected based on normal pH. Admitted for further work up of Acute Hypoxic Respiratory failure in the setting of restrictive disease requiring oxygen.     Pt is A&Ox3, and requested to leave AMA. NP involved in his care has made reasonable attempts to explain why leaving the hospital at this time may result in the patient's harm, injury, and even death. NP has explained the risks, benefits, and alternatives to treatment as well as the attending risks of refusing treatment at this time. However, pt adamantly   refusing admission.

## 2022-06-26 NOTE — ED ADULT NURSE NOTE - EXPLANATION OF PATIENT'S REASON FOR LEAVING
pt and mother states this problem has been going on for a long time and no need to stay in the hospital

## 2022-06-26 NOTE — DISCHARGE NOTE PROVIDER - NSDCFUSCHEDAPPT_GEN_ALL_CORE_FT
[facility]  [address] [facility]  Scheduled Appointment: 06/29/2022

## 2022-06-28 ENCOUNTER — INPATIENT (INPATIENT)
Facility: HOSPITAL | Age: 37
LOS: 7 days | Discharge: HOME | DRG: 189 | End: 2022-07-06
Attending: INTERNAL MEDICINE | Admitting: INTERNAL MEDICINE
Payer: MEDICAID

## 2022-06-28 VITALS
TEMPERATURE: 99 F | DIASTOLIC BLOOD PRESSURE: 87 MMHG | RESPIRATION RATE: 26 BRPM | OXYGEN SATURATION: 81 % | HEART RATE: 115 BPM | SYSTOLIC BLOOD PRESSURE: 135 MMHG | WEIGHT: 315 LBS | HEIGHT: 68 IN

## 2022-06-28 DIAGNOSIS — J96.00 ACUTE RESPIRATORY FAILURE, UNSPECIFIED WHETHER WITH HYPOXIA OR HYPERCAPNIA: ICD-10-CM

## 2022-06-28 PROBLEM — E11.9 TYPE 2 DIABETES MELLITUS WITHOUT COMPLICATIONS: Chronic | Status: ACTIVE | Noted: 2022-06-26

## 2022-06-28 LAB
ALBUMIN SERPL ELPH-MCNC: 2.6 G/DL — LOW (ref 3.5–5)
ALP SERPL-CCNC: 60 U/L — SIGNIFICANT CHANGE UP (ref 40–120)
ALT FLD-CCNC: 36 U/L DA — SIGNIFICANT CHANGE UP (ref 10–60)
ANION GAP SERPL CALC-SCNC: 4 MMOL/L — LOW (ref 5–17)
APTT BLD: 30.3 SEC — SIGNIFICANT CHANGE UP (ref 27.5–35.5)
AST SERPL-CCNC: 21 U/L — SIGNIFICANT CHANGE UP (ref 10–40)
BASE EXCESS BLDA CALC-SCNC: 10.2 MMOL/L — HIGH (ref -2–3)
BASOPHILS # BLD AUTO: 0.04 K/UL — SIGNIFICANT CHANGE UP (ref 0–0.2)
BASOPHILS NFR BLD AUTO: 0.4 % — SIGNIFICANT CHANGE UP (ref 0–2)
BILIRUB SERPL-MCNC: 0.2 MG/DL — SIGNIFICANT CHANGE UP (ref 0.2–1.2)
BLOOD GAS COMMENTS ARTERIAL: SIGNIFICANT CHANGE UP
BUN SERPL-MCNC: 13 MG/DL — SIGNIFICANT CHANGE UP (ref 7–18)
CALCIUM SERPL-MCNC: 8.9 MG/DL — SIGNIFICANT CHANGE UP (ref 8.4–10.5)
CHLORIDE SERPL-SCNC: 101 MMOL/L — SIGNIFICANT CHANGE UP (ref 96–108)
CO2 SERPL-SCNC: 36 MMOL/L — HIGH (ref 22–31)
CREAT SERPL-MCNC: 0.78 MG/DL — SIGNIFICANT CHANGE UP (ref 0.5–1.3)
D DIMER BLD IA.RAPID-MCNC: 198 NG/ML DDU — SIGNIFICANT CHANGE UP
EGFR: 119 ML/MIN/1.73M2 — SIGNIFICANT CHANGE UP
EOSINOPHIL # BLD AUTO: 0.28 K/UL — SIGNIFICANT CHANGE UP (ref 0–0.5)
EOSINOPHIL NFR BLD AUTO: 2.5 % — SIGNIFICANT CHANGE UP (ref 0–6)
GLUCOSE BLDC GLUCOMTR-MCNC: 143 MG/DL — HIGH (ref 70–99)
GLUCOSE BLDC GLUCOMTR-MCNC: 235 MG/DL — HIGH (ref 70–99)
GLUCOSE BLDC GLUCOMTR-MCNC: 258 MG/DL — HIGH (ref 70–99)
GLUCOSE SERPL-MCNC: 124 MG/DL — HIGH (ref 70–99)
HCO3 BLDA-SCNC: 36 MMOL/L — HIGH (ref 21–28)
HCT VFR BLD CALC: 44.4 % — SIGNIFICANT CHANGE UP (ref 39–50)
HGB BLD-MCNC: 13.6 G/DL — SIGNIFICANT CHANGE UP (ref 13–17)
IMM GRANULOCYTES NFR BLD AUTO: 0.5 % — SIGNIFICANT CHANGE UP (ref 0–1.5)
INR BLD: 1.02 RATIO — SIGNIFICANT CHANGE UP (ref 0.88–1.16)
LYMPHOCYTES # BLD AUTO: 1.77 K/UL — SIGNIFICANT CHANGE UP (ref 1–3.3)
LYMPHOCYTES # BLD AUTO: 16.1 % — SIGNIFICANT CHANGE UP (ref 13–44)
MCHC RBC-ENTMCNC: 28.2 PG — SIGNIFICANT CHANGE UP (ref 27–34)
MCHC RBC-ENTMCNC: 30.6 GM/DL — LOW (ref 32–36)
MCV RBC AUTO: 91.9 FL — SIGNIFICANT CHANGE UP (ref 80–100)
MONOCYTES # BLD AUTO: 1.01 K/UL — HIGH (ref 0–0.9)
MONOCYTES NFR BLD AUTO: 9.2 % — SIGNIFICANT CHANGE UP (ref 2–14)
NEUTROPHILS # BLD AUTO: 7.84 K/UL — HIGH (ref 1.8–7.4)
NEUTROPHILS NFR BLD AUTO: 71.3 % — SIGNIFICANT CHANGE UP (ref 43–77)
NRBC # BLD: 0 /100 WBCS — SIGNIFICANT CHANGE UP (ref 0–0)
NT-PROBNP SERPL-SCNC: 259 PG/ML — HIGH (ref 0–125)
PCO2 BLDA: 52 MMHG — HIGH (ref 35–48)
PH BLDA: 7.45 — SIGNIFICANT CHANGE UP (ref 7.35–7.45)
PLATELET # BLD AUTO: 247 K/UL — SIGNIFICANT CHANGE UP (ref 150–400)
PO2 BLDA: 161 MMHG — HIGH (ref 83–108)
POTASSIUM SERPL-MCNC: 4.3 MMOL/L — SIGNIFICANT CHANGE UP (ref 3.5–5.3)
POTASSIUM SERPL-SCNC: 4.3 MMOL/L — SIGNIFICANT CHANGE UP (ref 3.5–5.3)
PROT SERPL-MCNC: 7.3 G/DL — SIGNIFICANT CHANGE UP (ref 6–8.3)
PROTHROM AB SERPL-ACNC: 12.2 SEC — SIGNIFICANT CHANGE UP (ref 10.5–13.4)
RBC # BLD: 4.83 M/UL — SIGNIFICANT CHANGE UP (ref 4.2–5.8)
RBC # FLD: 15.9 % — HIGH (ref 10.3–14.5)
SAO2 % BLDA: 100 % — SIGNIFICANT CHANGE UP
SARS-COV-2 RNA SPEC QL NAA+PROBE: SIGNIFICANT CHANGE UP
SODIUM SERPL-SCNC: 141 MMOL/L — SIGNIFICANT CHANGE UP (ref 135–145)
TROPONIN I, HIGH SENSITIVITY RESULT: 9.6 NG/L — SIGNIFICANT CHANGE UP
WBC # BLD: 11 K/UL — HIGH (ref 3.8–10.5)
WBC # FLD AUTO: 11 K/UL — HIGH (ref 3.8–10.5)

## 2022-06-28 PROCEDURE — 71045 X-RAY EXAM CHEST 1 VIEW: CPT | Mod: 26

## 2022-06-28 PROCEDURE — 99285 EMERGENCY DEPT VISIT HI MDM: CPT

## 2022-06-28 RX ORDER — CEFTRIAXONE 500 MG/1
1000 INJECTION, POWDER, FOR SOLUTION INTRAMUSCULAR; INTRAVENOUS ONCE
Refills: 0 | Status: COMPLETED | OUTPATIENT
Start: 2022-06-28 | End: 2022-06-28

## 2022-06-28 RX ORDER — ACETAMINOPHEN 500 MG
650 TABLET ORAL ONCE
Refills: 0 | Status: COMPLETED | OUTPATIENT
Start: 2022-06-28 | End: 2022-06-28

## 2022-06-28 RX ORDER — DEXTROSE 50 % IN WATER 50 %
15 SYRINGE (ML) INTRAVENOUS ONCE
Refills: 0 | Status: DISCONTINUED | OUTPATIENT
Start: 2022-06-28 | End: 2022-07-01

## 2022-06-28 RX ORDER — INSULIN LISPRO 100/ML
VIAL (ML) SUBCUTANEOUS
Refills: 0 | Status: DISCONTINUED | OUTPATIENT
Start: 2022-06-28 | End: 2022-07-06

## 2022-06-28 RX ORDER — ALBUTEROL 90 UG/1
2 AEROSOL, METERED ORAL EVERY 6 HOURS
Refills: 0 | Status: DISCONTINUED | OUTPATIENT
Start: 2022-06-28 | End: 2022-07-05

## 2022-06-28 RX ORDER — ENOXAPARIN SODIUM 100 MG/ML
40 INJECTION SUBCUTANEOUS EVERY 24 HOURS
Refills: 0 | Status: DISCONTINUED | OUTPATIENT
Start: 2022-06-28 | End: 2022-06-29

## 2022-06-28 RX ORDER — ALBUTEROL 90 UG/1
2.5 AEROSOL, METERED ORAL
Refills: 0 | Status: COMPLETED | OUTPATIENT
Start: 2022-06-28 | End: 2022-06-28

## 2022-06-28 RX ORDER — AZITHROMYCIN 500 MG/1
500 TABLET, FILM COATED ORAL ONCE
Refills: 0 | Status: COMPLETED | OUTPATIENT
Start: 2022-06-28 | End: 2022-06-28

## 2022-06-28 RX ORDER — IPRATROPIUM/ALBUTEROL SULFATE 18-103MCG
3 AEROSOL WITH ADAPTER (GRAM) INHALATION EVERY 6 HOURS
Refills: 0 | Status: DISCONTINUED | OUTPATIENT
Start: 2022-06-28 | End: 2022-07-01

## 2022-06-28 RX ORDER — CHLORHEXIDINE GLUCONATE 213 G/1000ML
1 SOLUTION TOPICAL
Refills: 0 | Status: DISCONTINUED | OUTPATIENT
Start: 2022-06-28 | End: 2022-07-01

## 2022-06-28 RX ADMIN — CEFTRIAXONE 100 MILLIGRAM(S): 500 INJECTION, POWDER, FOR SOLUTION INTRAMUSCULAR; INTRAVENOUS at 13:53

## 2022-06-28 RX ADMIN — Medication 100 MILLIGRAM(S): at 21:20

## 2022-06-28 RX ADMIN — Medication 650 MILLIGRAM(S): at 10:31

## 2022-06-28 RX ADMIN — Medication 40 MILLIGRAM(S): at 17:10

## 2022-06-28 RX ADMIN — Medication 650 MILLIGRAM(S): at 09:30

## 2022-06-28 RX ADMIN — Medication 3 MILLILITER(S): at 20:30

## 2022-06-28 RX ADMIN — Medication 3 MILLILITER(S): at 15:15

## 2022-06-28 RX ADMIN — Medication 3: at 17:11

## 2022-06-28 RX ADMIN — Medication 125 MILLIGRAM(S): at 09:30

## 2022-06-28 RX ADMIN — AZITHROMYCIN 255 MILLIGRAM(S): 500 TABLET, FILM COATED ORAL at 15:13

## 2022-06-28 RX ADMIN — CHLORHEXIDINE GLUCONATE 1 APPLICATION(S): 213 SOLUTION TOPICAL at 17:11

## 2022-06-28 RX ADMIN — ALBUTEROL 2.5 MILLIGRAM(S): 90 AEROSOL, METERED ORAL at 10:11

## 2022-06-28 RX ADMIN — Medication 100 MILLIGRAM(S): at 13:54

## 2022-06-28 RX ADMIN — ENOXAPARIN SODIUM 40 MILLIGRAM(S): 100 INJECTION SUBCUTANEOUS at 13:53

## 2022-06-28 RX ADMIN — ALBUTEROL 2.5 MILLIGRAM(S): 90 AEROSOL, METERED ORAL at 09:42

## 2022-06-28 RX ADMIN — Medication 2: at 21:20

## 2022-06-28 RX ADMIN — ALBUTEROL 2.5 MILLIGRAM(S): 90 AEROSOL, METERED ORAL at 09:48

## 2022-06-28 NOTE — PATIENT PROFILE ADULT - FALL HARM RISK - HARM RISK INTERVENTIONS

## 2022-06-28 NOTE — ED ADULT NURSE NOTE - OBJECTIVE STATEMENT
patient presents to ED with c/o sob x4 days with cough and bloody sputum. patient is A&OX4, reports being admitted 2 days ago but did not stay.

## 2022-06-28 NOTE — H&P ADULT - ASSESSMENT
36M from home w/ morbid obesity and PMH of TAL (awaiting CPAP) and DM, presents with SOB x5 days    Assessment:  # AHRF due to Human metapneumovirus  # DM    Plan:  Neuro:  No Issues    Cardiovascular:  No issues    Pulmonary:   #AHRF due to viral infection  - Solumedrol 40 q12  - Duonebs  - Albuterol  - Tessalon perles q8    #TAL  - Consider CPAP at night    Gastrointestinal:  Diabetic diet    Renal:  No issues    Infectious Diseases:  # Human Metapneumovirus infection  - supportive care    Heme/onc:   No issues    Endo:   # DM  - Sliding scale  - FS ACHS    Skin/ catheter:   N/A    Prophylaxis:   Lovenox 40 sq qd    Goals of Care:   Full Code    Dispo:  Admit to ICU

## 2022-06-28 NOTE — ED PROVIDER NOTE - OBJECTIVE STATEMENT
36 year old male with a PHMx of TAL and type 2 diabetes presents to the ED with complaints of worsening shortness of breath and cough with blood since this morning. Patient states he recently left against medical advice in hospital 2 days ago, and was seen for difficulty breathing and respiratory failure. NKDA.

## 2022-06-28 NOTE — ED ADULT NURSE NOTE - ED STAT RN HANDOFF DETAILS
saturation 95% on NRB, transferred to ICU. patient denies pain/discomfort. IV intact no redness no swelling noted. Endorsed to KATELYNN Callaway

## 2022-06-28 NOTE — H&P ADULT - NSHPREVIEWOFSYSTEMS_GEN_ALL_CORE
CONSTITUTIONAL: No fever, weight loss, (+)fatigue  RESPIRATORY: No chills, (+) cough, (+) hemoptysis; (+) shortness of breath  CARDIOVASCULAR: No chest pain, palpitations, dizziness, or leg swelling  GASTROINTESTINAL: No abdominal pain. No nausea, vomiting, or hematemesis; No diarrhea or constipation. No melena or hematochezia.  GENITOURINARY: No dysuria or hematuria, urinary frequency  NEUROLOGICAL: No headaches, memory loss, loss of strength, numbness, or tremors  ENDOCRINE: No polyuria, polydipsia, or heat/cold intolerance  MUSKULOSKELETAL: No muscle aches, joint pains  HEME: no easy bruisability, no tender or enlarged lymph nodes  SKIN: No itching, burning, rashes, or lesions .

## 2022-06-28 NOTE — H&P ADULT - NSICDXPASTMEDICALHX_GEN_ALL_CORE_FT
PAST MEDICAL HISTORY:  2019 novel coronavirus disease (COVID-19) , 2019    Asthma     DM (diabetes mellitus)     Morbid obesity     TAL (obstructive sleep apnea)     Pneumonia , in 2019

## 2022-06-28 NOTE — H&P ADULT - ATTENDING COMMENTS
IMP: This is a 36 yr old man  from home with  morbid obesity ,  TAL (awaiting CPAP) and DM-2 uncontrol , presents with SOB x5 days Pat placed in isolation for Human metapneumovirus . He was admitted for acute hypoxic hypercapnic resp failure due to acute viral bronchitis     Assessment:    - Acute Hypoxic resp failure   - Acute viral bronchitis   - TAL on CPAP  - Morbid obesity   - DM-2 uncontrol   - Asthma       Plan   - Continue isolation   - Continue BiPaP alternate with O2 supp  - Albuterol neb   - Solumedrol   - Monitor blood sugar with coveage   - Diet   - Obtain PAP setting from ordering physician or equipment company   -DVT GI prophy

## 2022-06-28 NOTE — H&P ADULT - NSHPPHYSICALEXAM_GEN_ALL_CORE
General - NAD, sitting up in bed, well groomed  Eyes - PERRLA, EOM intact  ENT - Nonicteric sclerae, PERRLA, EOMI. Oropharynx clear. Moist mucous membranes. Conjunctivae appear well perfused.   Neck - No noticeable or palpable swelling, redness or rash around throat or on face  Lymph Nodes - No lymphadenopathy  Cardiovascular - RRR no m/r/g, no JVD, no carotid bruits  Lungs - (+) expiratory wheezing  Skin - No rashes, skin warm and dry, no erythematous areas  Abdomen - Normal bowel sounds, abdomen soft and nontender  Rectal – Rectal exam not performed since no symptoms indicated blood loss.  Extremities - No edema, cyanosis or clubbing  Musculoskeletal - 5/5 strength, normal range of motion, no swollen or erythematous joints.  Neuro– Alert and oriented x 3, CN 2-12 grossly intact.

## 2022-06-28 NOTE — H&P ADULT - HISTORY OF PRESENT ILLNESS
36M from home w/ morbid obesity and PMH of TAL (awaiting CPAP) and DM, presents with SOB x5 days. Pt returned to the ed with cough and sob. Pt with increased sputum production. Pt was at [facility] 6/26 and left AMA. Pt with bouts of coughing and difficulty breathing during this time. Pt with streaks of blood. He endorses nausea. He denies fever, chills, vomiting, chest pain, abdominal pain, urinary or bowel movement changes.

## 2022-06-28 NOTE — ED PROVIDER NOTE - NSICDXPASTMEDICALHX_GEN_ALL_CORE_FT
PAST MEDICAL HISTORY:  2019 novel coronavirus disease (COVID-19) , 2019    Asthma     DM (diabetes mellitus)     Morbid obesity     Pneumonia , in 2019      TAL (obstructive sleep apnea)

## 2022-06-28 NOTE — ED PROVIDER NOTE - CLINICAL SUMMARY MEDICAL DECISION MAKING FREE TEXT BOX
36 year old with respiratory failure and hemoptysis. Will place on BiPAP, obtain labs, chest x-ray, Iv and consult ICU.

## 2022-06-29 ENCOUNTER — APPOINTMENT (OUTPATIENT)
Dept: SURGERY | Facility: CLINIC | Age: 37
End: 2022-06-29

## 2022-06-29 LAB
A1C WITH ESTIMATED AVERAGE GLUCOSE RESULT: 8.5 % — HIGH (ref 4–5.6)
ALBUMIN SERPL ELPH-MCNC: 2.7 G/DL — LOW (ref 3.5–5)
ALP SERPL-CCNC: 63 U/L — SIGNIFICANT CHANGE UP (ref 40–120)
ALT FLD-CCNC: 33 U/L DA — SIGNIFICANT CHANGE UP (ref 10–60)
ANION GAP SERPL CALC-SCNC: 1 MMOL/L — LOW (ref 5–17)
AST SERPL-CCNC: 16 U/L — SIGNIFICANT CHANGE UP (ref 10–40)
BASE EXCESS BLDA CALC-SCNC: 12 MMOL/L — HIGH (ref -2–3)
BASE EXCESS BLDA CALC-SCNC: 12.3 MMOL/L — HIGH (ref -2–3)
BASOPHILS # BLD AUTO: 0.01 K/UL — SIGNIFICANT CHANGE UP (ref 0–0.2)
BASOPHILS NFR BLD AUTO: 0.1 % — SIGNIFICANT CHANGE UP (ref 0–2)
BILIRUB SERPL-MCNC: 0.2 MG/DL — SIGNIFICANT CHANGE UP (ref 0.2–1.2)
BLOOD GAS COMMENTS ARTERIAL: SIGNIFICANT CHANGE UP
BUN SERPL-MCNC: 11 MG/DL — SIGNIFICANT CHANGE UP (ref 7–18)
CALCIUM SERPL-MCNC: 9.2 MG/DL — SIGNIFICANT CHANGE UP (ref 8.4–10.5)
CHLORIDE SERPL-SCNC: 97 MMOL/L — SIGNIFICANT CHANGE UP (ref 96–108)
CHOLEST SERPL-MCNC: 199 MG/DL — SIGNIFICANT CHANGE UP
CO2 SERPL-SCNC: 39 MMOL/L — HIGH (ref 22–31)
CREAT SERPL-MCNC: 0.7 MG/DL — SIGNIFICANT CHANGE UP (ref 0.5–1.3)
EGFR: 123 ML/MIN/1.73M2 — SIGNIFICANT CHANGE UP
EOSINOPHIL # BLD AUTO: 0 K/UL — SIGNIFICANT CHANGE UP (ref 0–0.5)
EOSINOPHIL NFR BLD AUTO: 0 % — SIGNIFICANT CHANGE UP (ref 0–6)
ESTIMATED AVERAGE GLUCOSE: 197 MG/DL — HIGH (ref 68–114)
GLUCOSE BLDC GLUCOMTR-MCNC: 165 MG/DL — HIGH (ref 70–99)
GLUCOSE BLDC GLUCOMTR-MCNC: 174 MG/DL — HIGH (ref 70–99)
GLUCOSE BLDC GLUCOMTR-MCNC: 193 MG/DL — HIGH (ref 70–99)
GLUCOSE BLDC GLUCOMTR-MCNC: 227 MG/DL — HIGH (ref 70–99)
GLUCOSE SERPL-MCNC: 198 MG/DL — HIGH (ref 70–99)
HCO3 BLDA-SCNC: 42 MMOL/L — HIGH (ref 21–28)
HCO3 BLDA-SCNC: 42 MMOL/L — HIGH (ref 21–28)
HCT VFR BLD CALC: 47.8 % — SIGNIFICANT CHANGE UP (ref 39–50)
HDLC SERPL-MCNC: 52 MG/DL — SIGNIFICANT CHANGE UP
HGB BLD-MCNC: 14.1 G/DL — SIGNIFICANT CHANGE UP (ref 13–17)
HOROWITZ INDEX BLDA+IHG-RTO: 60 — SIGNIFICANT CHANGE UP
HOROWITZ INDEX BLDA+IHG-RTO: SIGNIFICANT CHANGE UP
IMM GRANULOCYTES NFR BLD AUTO: 0.9 % — SIGNIFICANT CHANGE UP (ref 0–1.5)
LIPID PNL WITH DIRECT LDL SERPL: 128 MG/DL — HIGH
LYMPHOCYTES # BLD AUTO: 1.07 K/UL — SIGNIFICANT CHANGE UP (ref 1–3.3)
LYMPHOCYTES # BLD AUTO: 12.3 % — LOW (ref 13–44)
MAGNESIUM SERPL-MCNC: 2.7 MG/DL — HIGH (ref 1.6–2.6)
MCHC RBC-ENTMCNC: 27.7 PG — SIGNIFICANT CHANGE UP (ref 27–34)
MCHC RBC-ENTMCNC: 29.5 GM/DL — LOW (ref 32–36)
MCV RBC AUTO: 93.9 FL — SIGNIFICANT CHANGE UP (ref 80–100)
MONOCYTES # BLD AUTO: 0.78 K/UL — SIGNIFICANT CHANGE UP (ref 0–0.9)
MONOCYTES NFR BLD AUTO: 9 % — SIGNIFICANT CHANGE UP (ref 2–14)
MRSA PCR RESULT.: SIGNIFICANT CHANGE UP
NEUTROPHILS # BLD AUTO: 6.76 K/UL — SIGNIFICANT CHANGE UP (ref 1.8–7.4)
NEUTROPHILS NFR BLD AUTO: 77.7 % — HIGH (ref 43–77)
NON HDL CHOLESTEROL: 147 MG/DL — HIGH
NRBC # BLD: 0 /100 WBCS — SIGNIFICANT CHANGE UP (ref 0–0)
PCO2 BLDA: 77 MMHG — CRITICAL HIGH (ref 35–48)
PCO2 BLDA: 82 MMHG — CRITICAL HIGH (ref 35–48)
PH BLDA: 7.32 — LOW (ref 7.35–7.45)
PH BLDA: 7.34 — LOW (ref 7.35–7.45)
PHOSPHATE SERPL-MCNC: 4.7 MG/DL — HIGH (ref 2.5–4.5)
PLATELET # BLD AUTO: 252 K/UL — SIGNIFICANT CHANGE UP (ref 150–400)
PO2 BLDA: 120 MMHG — HIGH (ref 83–108)
PO2 BLDA: 54 MMHG — LOW (ref 83–108)
POTASSIUM SERPL-MCNC: 4.7 MMOL/L — SIGNIFICANT CHANGE UP (ref 3.5–5.3)
POTASSIUM SERPL-SCNC: 4.7 MMOL/L — SIGNIFICANT CHANGE UP (ref 3.5–5.3)
PROT SERPL-MCNC: 7.8 G/DL — SIGNIFICANT CHANGE UP (ref 6–8.3)
RBC # BLD: 5.09 M/UL — SIGNIFICANT CHANGE UP (ref 4.2–5.8)
RBC # FLD: 15.2 % — HIGH (ref 10.3–14.5)
S AUREUS DNA NOSE QL NAA+PROBE: DETECTED
SAO2 % BLDA: 86 % — SIGNIFICANT CHANGE UP
SAO2 % BLDA: 99 % — SIGNIFICANT CHANGE UP
SODIUM SERPL-SCNC: 137 MMOL/L — SIGNIFICANT CHANGE UP (ref 135–145)
TRIGL SERPL-MCNC: 96 MG/DL — SIGNIFICANT CHANGE UP
WBC # BLD: 8.7 K/UL — SIGNIFICANT CHANGE UP (ref 3.8–10.5)
WBC # FLD AUTO: 8.7 K/UL — SIGNIFICANT CHANGE UP (ref 3.8–10.5)

## 2022-06-29 RX ORDER — SENNA PLUS 8.6 MG/1
2 TABLET ORAL AT BEDTIME
Refills: 0 | Status: DISCONTINUED | OUTPATIENT
Start: 2022-06-29 | End: 2022-07-06

## 2022-06-29 RX ORDER — ENOXAPARIN SODIUM 100 MG/ML
60 INJECTION SUBCUTANEOUS EVERY 12 HOURS
Refills: 0 | Status: DISCONTINUED | OUTPATIENT
Start: 2022-06-29 | End: 2022-07-05

## 2022-06-29 RX ORDER — ACETAMINOPHEN 500 MG
650 TABLET ORAL ONCE
Refills: 0 | Status: COMPLETED | OUTPATIENT
Start: 2022-06-29 | End: 2022-06-29

## 2022-06-29 RX ORDER — ACETAMINOPHEN 500 MG
650 TABLET ORAL EVERY 6 HOURS
Refills: 0 | Status: DISCONTINUED | OUTPATIENT
Start: 2022-06-29 | End: 2022-07-06

## 2022-06-29 RX ORDER — POLYETHYLENE GLYCOL 3350 17 G/17G
17 POWDER, FOR SOLUTION ORAL DAILY
Refills: 0 | Status: DISCONTINUED | OUTPATIENT
Start: 2022-06-29 | End: 2022-07-06

## 2022-06-29 RX ADMIN — Medication 650 MILLIGRAM(S): at 09:05

## 2022-06-29 RX ADMIN — Medication 3 MILLILITER(S): at 09:19

## 2022-06-29 RX ADMIN — Medication 2: at 11:36

## 2022-06-29 RX ADMIN — Medication 3 MILLILITER(S): at 03:16

## 2022-06-29 RX ADMIN — Medication 3 MILLILITER(S): at 15:42

## 2022-06-29 RX ADMIN — Medication 650 MILLIGRAM(S): at 05:08

## 2022-06-29 RX ADMIN — Medication 100 MILLIGRAM(S): at 21:27

## 2022-06-29 RX ADMIN — Medication 1: at 21:27

## 2022-06-29 RX ADMIN — Medication 100 MILLIGRAM(S): at 14:14

## 2022-06-29 RX ADMIN — ENOXAPARIN SODIUM 60 MILLIGRAM(S): 100 INJECTION SUBCUTANEOUS at 17:33

## 2022-06-29 RX ADMIN — Medication 40 MILLIGRAM(S): at 10:40

## 2022-06-29 RX ADMIN — Medication 650 MILLIGRAM(S): at 09:25

## 2022-06-29 RX ADMIN — SENNA PLUS 2 TABLET(S): 8.6 TABLET ORAL at 21:27

## 2022-06-29 RX ADMIN — CHLORHEXIDINE GLUCONATE 1 APPLICATION(S): 213 SOLUTION TOPICAL at 05:07

## 2022-06-29 RX ADMIN — Medication 1: at 06:32

## 2022-06-29 RX ADMIN — Medication 40 MILLIGRAM(S): at 05:07

## 2022-06-29 RX ADMIN — Medication 650 MILLIGRAM(S): at 06:00

## 2022-06-29 RX ADMIN — Medication 1: at 16:32

## 2022-06-29 RX ADMIN — Medication 3 MILLILITER(S): at 20:08

## 2022-06-29 RX ADMIN — Medication 100 MILLIGRAM(S): at 05:07

## 2022-06-29 NOTE — PHARMACOTHERAPY INTERVENTION NOTE - COMMENTS
Recommended changing prophylaxis dose and frequency of enoxaparin 40mg Q24hrs to 60mg Q12hrs due to patient's BMI.

## 2022-06-29 NOTE — PROGRESS NOTE ADULT - ASSESSMENT
36M from home w/ morbid obesity and PMH of TAL (awaiting CPAP) and DM, presents with SOB x5 days    Assessment:  # AHRF due to Human metapneumovirus  # DM    Plan:  Neuro:  No Issues    Cardiovascular:  No issues    Pulmonary:   #AHRF due to viral infection  - Solumedrol 40 q12  - Duonebs  - Albuterol  - Tessalon perles q8  - was on BIPAP 6/28, now on NRB, titrate down as tolerated      #TAL  - Consider CPAP at night    Gastrointestinal:  Diabetic diet    Renal:  No issues    Infectious Diseases:  # Human Metapneumovirus infection  - supportive care    Heme/onc:   No issues    Endo:   # DM  - Sliding scale  - FS ACHS    Skin/ catheter:   N/A    Prophylaxis:   Lovenox 40 sq qd    Goals of Care:   Full Code    Dispo:  Admit to ICU  36M from home w/ morbid obesity and PMH of TAL (awaiting CPAP) and DM, presents with SOB x5 days    Assessment:  # AHRF due to Human metapneumovirus  # DM    Plan:  Neuro:  No Issues    Cardiovascular:  No issues    Pulmonary:   #AHRF due to viral infection  - Solumedrol 40 q12  - Duonebs  - Albuterol  - Tessalon perles q8  - was on BIPAP 6/28, now on NRB, titrate down as tolerated      #TAL  - BIPAP 22/18 as per outpatient sleep study  - 85% at rest, lowest 68% on room air during sleep study  - BIPAP QHS    Gastrointestinal:  Diabetic diet    Renal:  No issues    Infectious Diseases:  # Human Metapneumovirus infection  - supportive care    Heme/onc:   No issues    Endo:   # DM  - A1c 8.5%  - Sliding scale  - FS ACHS    Skin/ catheter:   N/A    Prophylaxis:   Lovenox 40 sq qd    Goals of Care:   Full Code    Dispo:  Admit to ICU  36M from home w/ morbid obesity and PMH of TAL (awaiting CPAP) and DM, presents with SOB x5 days    Assessment:  # AHRF due to Human metapneumovirus  # DM    Plan:  Neuro:  No Issues    Cardiovascular:  No issues    Pulmonary:   #AHRF due to viral infection  - Solumedrol 40 q12 to Prednisone 40 x5 days 6/29-7/4  - Duonebs  - Albuterol  - Tessalon perles q8  - was on BIPAP 6/28, now on NRB, titrate down as tolerated      #TAL  - BIPAP 22/18 as per outpatient sleep study  - 85% at rest, lowest 68% on room air during sleep study  - BIPAP QHS    Gastrointestinal:  Diabetic diet    Renal:  No issues    Infectious Diseases:  # Human Metapneumovirus infection  - supportive care    Heme/onc:   No issues    Endo:   # DM  - A1c 8.5%  - Sliding scale  - FS ACHS    Skin/ catheter:   N/A    Prophylaxis:   Lovenox 40 sq qd    Goals of Care:   Full Code    Dispo:  Admit to ICU

## 2022-06-29 NOTE — PROGRESS NOTE ADULT - SUBJECTIVE AND OBJECTIVE BOX
INTERVAL HPI/OVERNIGHT EVENTS:   Pt tachypneic and tachycardic while laying flat, improved when placed on incline.    PRESSORS: [ ] YES [ ] NO  WHICH:    ANTIBIOTICS:                      Antimicrobial:    Cardiovascular:    Pulmonary:  ALBUTerol    90 MICROgram(s) HFA Inhaler 2 Puff(s) Inhalation every 6 hours  albuterol/ipratropium for Nebulization 3 milliLiter(s) Nebulizer every 6 hours  benzonatate 100 milliGRAM(s) Oral three times a day    Hematalogic:  enoxaparin Injectable 40 milliGRAM(s) SubCutaneous every 24 hours    Other:  chlorhexidine 2% Cloths 1 Application(s) Topical <User Schedule>  dextrose Oral Gel 15 Gram(s) Oral once PRN  insulin lispro (ADMELOG) corrective regimen sliding scale   SubCutaneous Before meals and at bedtime  methylPREDNISolone sodium succinate Injectable 40 milliGRAM(s) IV Push every 12 hours    ALBUTerol    90 MICROgram(s) HFA Inhaler 2 Puff(s) Inhalation every 6 hours  albuterol/ipratropium for Nebulization 3 milliLiter(s) Nebulizer every 6 hours  benzonatate 100 milliGRAM(s) Oral three times a day  chlorhexidine 2% Cloths 1 Application(s) Topical <User Schedule>  dextrose Oral Gel 15 Gram(s) Oral once PRN  enoxaparin Injectable 40 milliGRAM(s) SubCutaneous every 24 hours  insulin lispro (ADMELOG) corrective regimen sliding scale   SubCutaneous Before meals and at bedtime  methylPREDNISolone sodium succinate Injectable 40 milliGRAM(s) IV Push every 12 hours    Drug Dosing Weight  Height (cm): 172.7 (28 Jun 2022 06:32)  Weight (kg): 196.5 (28 Jun 2022 16:25)  BMI (kg/m2): 65.9 (28 Jun 2022 16:25)  BSA (m2): 2.84 (28 Jun 2022 16:25)    CENTRAL LINE: [ ] YES [ ] NO  LOCATION:   DATE INSERTED:  REMOVE: [ ] YES [ ] NO  EXPLAIN:    HORVATH: [ ] YES [ ] NO    DATE INSERTED:  REMOVE:  [ ] YES [ ] NO  EXPLAIN:    A-LINE:  [ ] YES [ ] NO  LOCATION:   DATE INSERTED:  REMOVE:  [ ] YES [ ] NO  EXPLAIN:    PMH -reviewed admission note, no change since admission    ICU Vital Signs Last 24 Hrs  T(C): 36.2 (29 Jun 2022 04:00), Max: 36.7 (28 Jun 2022 16:25)  T(F): 97.1 (29 Jun 2022 04:00), Max: 98 (28 Jun 2022 16:25)  HR: 83 (29 Jun 2022 07:00) (66 - 100)  BP: 121/61 (29 Jun 2022 07:00) (112/56 - 151/81)  BP(mean): 77 (29 Jun 2022 07:00) (65 - 97)  ABP: --  ABP(mean): --  RR: 18 (29 Jun 2022 07:00) (16 - 35)  SpO2: 97% (29 Jun 2022 07:00) (95% - 99%)      ABG - ( 28 Jun 2022 09:31 )  pH, Arterial: 7.45  pH, Blood: x     /  pCO2: 52    /  pO2: 161   / HCO3: 36    / Base Excess: 10.2  /  SaO2: 100                   06-28 @ 07:01  -  06-29 @ 07:00  --------------------------------------------------------  IN: 800 mL / OUT: 1580 mL / NET: -780 mL            PHYSICAL EXAM:   General - NAD, sitting up in bed, well groomed  Eyes - PERRLA, EOM intact  ENT - Nonicteric sclerae, PERRLA, EOMI. Oropharynx clear. Moist mucous membranes. Conjunctivae appear well perfused.   Neck - No noticeable or palpable swelling, redness or rash around throat or on face  Lymph Nodes - No lymphadenopathy  Cardiovascular - RRR no m/r/g, no JVD, no carotid bruits  Lungs - (+) expiratory wheezing  Skin - No rashes, skin warm and dry, no erythematous areas  Abdomen - Normal bowel sounds, abdomen soft and nontender  Rectal – Rectal exam not performed since no symptoms indicated blood loss.  Extremities - No edema, cyanosis or clubbing  Musculoskeletal - 5/5 strength, normal range of motion, no swollen or erythematous joints.  Neuro– Alert and oriented x 3, CN 2-12 grossly intact.      LABS:  CBC Full  -  ( 29 Jun 2022 03:54 )  WBC Count : 8.70 K/uL  RBC Count : 5.09 M/uL  Hemoglobin : 14.1 g/dL  Hematocrit : 47.8 %  Platelet Count - Automated : 252 K/uL  Mean Cell Volume : 93.9 fl  Mean Cell Hemoglobin : 27.7 pg  Mean Cell Hemoglobin Concentration : 29.5 gm/dL  Auto Neutrophil # : 6.76 K/uL  Auto Lymphocyte # : 1.07 K/uL  Auto Monocyte # : 0.78 K/uL  Auto Eosinophil # : 0.00 K/uL  Auto Basophil # : 0.01 K/uL  Auto Neutrophil % : 77.7 %  Auto Lymphocyte % : 12.3 %  Auto Monocyte % : 9.0 %  Auto Eosinophil % : 0.0 %  Auto Basophil % : 0.1 %    06-29    137  |  97  |  11  ----------------------------<  198<H>  4.7   |  39<H>  |  0.70    Ca    9.2      29 Jun 2022 03:54  Phos  4.7     06-29  Mg     2.7     06-29    TPro  7.8  /  Alb  2.7<L>  /  TBili  0.2  /  DBili  x   /  AST  16  /  ALT  33  /  AlkPhos  63  06-29    PT/INR - ( 28 Jun 2022 09:57 )   PT: 12.2 sec;   INR: 1.02 ratio         PTT - ( 28 Jun 2022 09:57 )  PTT:30.3 sec    Culture Results:   No growth to date. (06-26 @ 19:03)  Culture Results:   No growth to date. (06-26 @ 19:03)      RADIOLOGY & ADDITIONAL STUDIES REVIEWED:  ***    GOALS OF CARE DISCUSSION WITH PATIENT/FAMILY/PROXY:    CRITICAL CARE TIME SPENT: 35 minutes INTERVAL HPI/OVERNIGHT EVENTS:   Pt tachypneic and tachycardic while laying flat, improved when placed on incline.    PRESSORS: [ ] YES [ ] NO  WHICH:    ANTIBIOTICS:                      Antimicrobial:    Cardiovascular:    Pulmonary:  ALBUTerol    90 MICROgram(s) HFA Inhaler 2 Puff(s) Inhalation every 6 hours  albuterol/ipratropium for Nebulization 3 milliLiter(s) Nebulizer every 6 hours  benzonatate 100 milliGRAM(s) Oral three times a day    Hematalogic:  enoxaparin Injectable 40 milliGRAM(s) SubCutaneous every 24 hours    Other:  chlorhexidine 2% Cloths 1 Application(s) Topical <User Schedule>  dextrose Oral Gel 15 Gram(s) Oral once PRN  insulin lispro (ADMELOG) corrective regimen sliding scale   SubCutaneous Before meals and at bedtime  methylPREDNISolone sodium succinate Injectable 40 milliGRAM(s) IV Push every 12 hours    ALBUTerol    90 MICROgram(s) HFA Inhaler 2 Puff(s) Inhalation every 6 hours  albuterol/ipratropium for Nebulization 3 milliLiter(s) Nebulizer every 6 hours  benzonatate 100 milliGRAM(s) Oral three times a day  chlorhexidine 2% Cloths 1 Application(s) Topical <User Schedule>  dextrose Oral Gel 15 Gram(s) Oral once PRN  enoxaparin Injectable 40 milliGRAM(s) SubCutaneous every 24 hours  insulin lispro (ADMELOG) corrective regimen sliding scale   SubCutaneous Before meals and at bedtime  methylPREDNISolone sodium succinate Injectable 40 milliGRAM(s) IV Push every 12 hours    Drug Dosing Weight  Height (cm): 172.7 (28 Jun 2022 06:32)  Weight (kg): 196.5 (28 Jun 2022 16:25)  BMI (kg/m2): 65.9 (28 Jun 2022 16:25)  BSA (m2): 2.84 (28 Jun 2022 16:25)    CENTRAL LINE: [ ] YES [ ] NO  LOCATION:   DATE INSERTED:  REMOVE: [ ] YES [ ] NO  EXPLAIN:    HORVATH: [ ] YES [ ] NO    DATE INSERTED:  REMOVE:  [ ] YES [ ] NO  EXPLAIN:    A-LINE:  [ ] YES [ ] NO  LOCATION:   DATE INSERTED:  REMOVE:  [ ] YES [ ] NO  EXPLAIN:    PMH -reviewed admission note, no change since admission    ICU Vital Signs Last 24 Hrs  T(C): 36.2 (29 Jun 2022 04:00), Max: 36.7 (28 Jun 2022 16:25)  T(F): 97.1 (29 Jun 2022 04:00), Max: 98 (28 Jun 2022 16:25)  HR: 83 (29 Jun 2022 07:00) (66 - 100)  BP: 121/61 (29 Jun 2022 07:00) (112/56 - 151/81)  BP(mean): 77 (29 Jun 2022 07:00) (65 - 97)  ABP: --  ABP(mean): --  RR: 18 (29 Jun 2022 07:00) (16 - 35)  SpO2: 97% (29 Jun 2022 07:00) (95% - 99%)      ABG - ( 28 Jun 2022 09:31 )  pH, Arterial: 7.45  pH, Blood: x     /  pCO2: 52    /  pO2: 161   / HCO3: 36    / Base Excess: 10.2  /  SaO2: 100                   06-28 @ 07:01  -  06-29 @ 07:00  --------------------------------------------------------  IN: 800 mL / OUT: 1580 mL / NET: -780 mL            PHYSICAL EXAM:   General - NAD, sitting up in bed, well groomed  Eyes - PERRLA, EOM intact  ENT - Nonicteric sclerae, PERRLA, EOMI. Oropharynx clear. Moist mucous membranes. Conjunctivae appear well perfused.   Neck - No noticeable or palpable swelling, redness or rash around throat or on face  Lymph Nodes - No lymphadenopathy  Cardiovascular - RRR no m/r/g, no JVD, no carotid bruits  Lungs - (+) minimal expiratory wheezing on R lung field, L lung field clear breathsounds  Skin - No rashes, skin warm and dry, no erythematous areas  Abdomen - Normal bowel sounds, abdomen soft and nontender  Rectal – Rectal exam not performed since no symptoms indicated blood loss.  Extremities - No edema, cyanosis or clubbing  Musculoskeletal - 5/5 strength, normal range of motion, no swollen or erythematous joints.  Neuro– Alert and oriented x 3, CN 2-12 grossly intact.      LABS:  CBC Full  -  ( 29 Jun 2022 03:54 )  WBC Count : 8.70 K/uL  RBC Count : 5.09 M/uL  Hemoglobin : 14.1 g/dL  Hematocrit : 47.8 %  Platelet Count - Automated : 252 K/uL  Mean Cell Volume : 93.9 fl  Mean Cell Hemoglobin : 27.7 pg  Mean Cell Hemoglobin Concentration : 29.5 gm/dL  Auto Neutrophil # : 6.76 K/uL  Auto Lymphocyte # : 1.07 K/uL  Auto Monocyte # : 0.78 K/uL  Auto Eosinophil # : 0.00 K/uL  Auto Basophil # : 0.01 K/uL  Auto Neutrophil % : 77.7 %  Auto Lymphocyte % : 12.3 %  Auto Monocyte % : 9.0 %  Auto Eosinophil % : 0.0 %  Auto Basophil % : 0.1 %    06-29    137  |  97  |  11  ----------------------------<  198<H>  4.7   |  39<H>  |  0.70    Ca    9.2      29 Jun 2022 03:54  Phos  4.7     06-29  Mg     2.7     06-29    TPro  7.8  /  Alb  2.7<L>  /  TBili  0.2  /  DBili  x   /  AST  16  /  ALT  33  /  AlkPhos  63  06-29    PT/INR - ( 28 Jun 2022 09:57 )   PT: 12.2 sec;   INR: 1.02 ratio         PTT - ( 28 Jun 2022 09:57 )  PTT:30.3 sec    Culture Results:   No growth to date. (06-26 @ 19:03)  Culture Results:   No growth to date. (06-26 @ 19:03)      RADIOLOGY & ADDITIONAL STUDIES REVIEWED:  ***    GOALS OF CARE DISCUSSION WITH PATIENT/FAMILY/PROXY:    CRITICAL CARE TIME SPENT: 35 minutes

## 2022-06-30 LAB
ALBUMIN SERPL ELPH-MCNC: 2.6 G/DL — LOW (ref 3.5–5)
ALP SERPL-CCNC: 51 U/L — SIGNIFICANT CHANGE UP (ref 40–120)
ALT FLD-CCNC: 30 U/L DA — SIGNIFICANT CHANGE UP (ref 10–60)
ANION GAP SERPL CALC-SCNC: 3 MMOL/L — LOW (ref 5–17)
AST SERPL-CCNC: 13 U/L — SIGNIFICANT CHANGE UP (ref 10–40)
BASE EXCESS BLDA CALC-SCNC: 14.8 MMOL/L — HIGH (ref -2–3)
BASE EXCESS BLDA CALC-SCNC: 15.5 MMOL/L — HIGH (ref -2–3)
BASOPHILS # BLD AUTO: 0.02 K/UL — SIGNIFICANT CHANGE UP (ref 0–0.2)
BASOPHILS NFR BLD AUTO: 0.2 % — SIGNIFICANT CHANGE UP (ref 0–2)
BILIRUB SERPL-MCNC: 0.2 MG/DL — SIGNIFICANT CHANGE UP (ref 0.2–1.2)
BLOOD GAS COMMENTS ARTERIAL: SIGNIFICANT CHANGE UP
BLOOD GAS COMMENTS ARTERIAL: SIGNIFICANT CHANGE UP
BUN SERPL-MCNC: 16 MG/DL — SIGNIFICANT CHANGE UP (ref 7–18)
CALCIUM SERPL-MCNC: 8.9 MG/DL — SIGNIFICANT CHANGE UP (ref 8.4–10.5)
CHLORIDE SERPL-SCNC: 97 MMOL/L — SIGNIFICANT CHANGE UP (ref 96–108)
CO2 SERPL-SCNC: 38 MMOL/L — HIGH (ref 22–31)
CREAT SERPL-MCNC: 0.62 MG/DL — SIGNIFICANT CHANGE UP (ref 0.5–1.3)
EGFR: 128 ML/MIN/1.73M2 — SIGNIFICANT CHANGE UP
EOSINOPHIL # BLD AUTO: 0.01 K/UL — SIGNIFICANT CHANGE UP (ref 0–0.5)
EOSINOPHIL NFR BLD AUTO: 0.1 % — SIGNIFICANT CHANGE UP (ref 0–6)
GLUCOSE BLDC GLUCOMTR-MCNC: 100 MG/DL — HIGH (ref 70–99)
GLUCOSE BLDC GLUCOMTR-MCNC: 132 MG/DL — HIGH (ref 70–99)
GLUCOSE BLDC GLUCOMTR-MCNC: 152 MG/DL — HIGH (ref 70–99)
GLUCOSE BLDC GLUCOMTR-MCNC: 185 MG/DL — HIGH (ref 70–99)
GLUCOSE SERPL-MCNC: 115 MG/DL — HIGH (ref 70–99)
HCO3 BLDA-SCNC: 42 MMOL/L — HIGH (ref 21–28)
HCO3 BLDA-SCNC: 43 MMOL/L — HIGH (ref 21–28)
HCT VFR BLD CALC: 44.8 % — SIGNIFICANT CHANGE UP (ref 39–50)
HGB BLD-MCNC: 13.7 G/DL — SIGNIFICANT CHANGE UP (ref 13–17)
HOROWITZ INDEX BLDA+IHG-RTO: 40 — SIGNIFICANT CHANGE UP
HOROWITZ INDEX BLDA+IHG-RTO: SIGNIFICANT CHANGE UP
IMM GRANULOCYTES NFR BLD AUTO: 0.6 % — SIGNIFICANT CHANGE UP (ref 0–1.5)
LYMPHOCYTES # BLD AUTO: 2.7 K/UL — SIGNIFICANT CHANGE UP (ref 1–3.3)
LYMPHOCYTES # BLD AUTO: 21.5 % — SIGNIFICANT CHANGE UP (ref 13–44)
MAGNESIUM SERPL-MCNC: 2.5 MG/DL — SIGNIFICANT CHANGE UP (ref 1.6–2.6)
MCHC RBC-ENTMCNC: 28 PG — SIGNIFICANT CHANGE UP (ref 27–34)
MCHC RBC-ENTMCNC: 30.6 GM/DL — LOW (ref 32–36)
MCV RBC AUTO: 91.6 FL — SIGNIFICANT CHANGE UP (ref 80–100)
MONOCYTES # BLD AUTO: 1.2 K/UL — HIGH (ref 0–0.9)
MONOCYTES NFR BLD AUTO: 9.5 % — SIGNIFICANT CHANGE UP (ref 2–14)
NEUTROPHILS # BLD AUTO: 8.57 K/UL — HIGH (ref 1.8–7.4)
NEUTROPHILS NFR BLD AUTO: 68.1 % — SIGNIFICANT CHANGE UP (ref 43–77)
NRBC # BLD: 0 /100 WBCS — SIGNIFICANT CHANGE UP (ref 0–0)
PCO2 BLDA: 60 MMHG — HIGH (ref 35–48)
PCO2 BLDA: 63 MMHG — HIGH (ref 35–48)
PH BLDA: 7.44 — SIGNIFICANT CHANGE UP (ref 7.35–7.45)
PH BLDA: 7.45 — SIGNIFICANT CHANGE UP (ref 7.35–7.45)
PHOSPHATE SERPL-MCNC: 3.2 MG/DL — SIGNIFICANT CHANGE UP (ref 2.5–4.5)
PLATELET # BLD AUTO: 252 K/UL — SIGNIFICANT CHANGE UP (ref 150–400)
PO2 BLDA: 68 MMHG — LOW (ref 83–108)
PO2 BLDA: 70 MMHG — LOW (ref 83–108)
POTASSIUM SERPL-MCNC: 3.8 MMOL/L — SIGNIFICANT CHANGE UP (ref 3.5–5.3)
POTASSIUM SERPL-SCNC: 3.8 MMOL/L — SIGNIFICANT CHANGE UP (ref 3.5–5.3)
PROT SERPL-MCNC: 7.1 G/DL — SIGNIFICANT CHANGE UP (ref 6–8.3)
RBC # BLD: 4.89 M/UL — SIGNIFICANT CHANGE UP (ref 4.2–5.8)
RBC # FLD: 15.2 % — HIGH (ref 10.3–14.5)
SAO2 % BLDA: 95 % — SIGNIFICANT CHANGE UP
SAO2 % BLDA: 95 % — SIGNIFICANT CHANGE UP
SODIUM SERPL-SCNC: 138 MMOL/L — SIGNIFICANT CHANGE UP (ref 135–145)
WBC # BLD: 12.57 K/UL — HIGH (ref 3.8–10.5)
WBC # FLD AUTO: 12.57 K/UL — HIGH (ref 3.8–10.5)

## 2022-06-30 RX ADMIN — Medication 100 MILLIGRAM(S): at 22:00

## 2022-06-30 RX ADMIN — Medication 40 MILLIGRAM(S): at 05:26

## 2022-06-30 RX ADMIN — Medication 100 MILLIGRAM(S): at 13:43

## 2022-06-30 RX ADMIN — ENOXAPARIN SODIUM 60 MILLIGRAM(S): 100 INJECTION SUBCUTANEOUS at 05:27

## 2022-06-30 RX ADMIN — ENOXAPARIN SODIUM 60 MILLIGRAM(S): 100 INJECTION SUBCUTANEOUS at 18:34

## 2022-06-30 RX ADMIN — CHLORHEXIDINE GLUCONATE 1 APPLICATION(S): 213 SOLUTION TOPICAL at 05:26

## 2022-06-30 RX ADMIN — Medication 1: at 12:03

## 2022-06-30 RX ADMIN — Medication 100 MILLIGRAM(S): at 05:27

## 2022-06-30 RX ADMIN — Medication 3 MILLILITER(S): at 20:14

## 2022-06-30 RX ADMIN — Medication 3 MILLILITER(S): at 09:15

## 2022-06-30 RX ADMIN — Medication 3 MILLILITER(S): at 15:12

## 2022-06-30 RX ADMIN — Medication 1: at 17:01

## 2022-06-30 NOTE — DIETITIAN INITIAL EVALUATION ADULT - OTHER INFO
Pt reports appetite/PO intake has increased to 80-90% while hospitalized.  Pt states -300lbs ~ 2 years. Dosing weight 433.2lbs (6/28/22), daily weight 433.3lbs(6/29/22), 434.5lbs (6/30/22), indicating weight gain 134.5lbs/31% in ~ 2 years. Pt states weight gain is due to the Covid pandemic. Of note, Pt with fluid accumulation. Pt spoke of having gastric bypass surgery; is aware he needs to be on a strict weight loss regimen prior to the procedure.    Pt newly diagnosed with DM ~ 2 months ago, A1c 8.5% (6/29/22). Pt taking Metformin at home; checks fingersticks daily, readings range from 117-148. Pt with elevated blood glucose in house, receiving Admelog.    Discussed with Pt the importance of consuming healthy well balance meals daily; consuming more fresh lean meats and vegetable; carbohydrate counting. Provided Pt with My Plate, Carbohydrate Counting for People with Diabetes, and Reading Label Tips Nutrition Education. Recommended Pt follow up with Outpatient Endocrinologist and Registered Dietitian. Pt verbalized understanding.    Pt reports appetite/PO intake has increased to 80-90% while hospitalized.  Pt states -300lbs ~ 2 years. Dosing weight 433.2lbs (6/28/22), daily weight 433.3lbs(6/29/22), 434.5lbs (6/30/22), indicating weight gain 134.5lbs/31% in ~ 2 years. Pt states weight gain is due to the Covid pandemic. Of note, Pt with fluid accumulation. Pt spoke of having gastric bypass surgery; is aware he needs to be on a strict weight loss regimen prior to the procedure.    Pt recently diagnosed with DM ~ 2 months ago, A1c 8.5% (6/29/22). Pt taking Metformin at home; checks fingersticks daily, readings range from 117-148. Pt with elevated blood glucose in house, receiving Admelog.    Discussed with Pt the importance of consuming healthy well balance meals daily; consuming more fresh lean meats and vegetable; carbohydrate counting. Provided Pt with My Plate, Carbohydrate Counting for People with Diabetes, and Reading Label Tips Nutrition Education. Recommended Pt follow up with Outpatient Endocrinologist and Registered Dietitian. Pt verbalized understanding.    Pt reports appetite/PO intake has increased to 80-90% while hospitalized.  Pt states -300lbs ~ 2 years. Dosing weight 433.2lbs (6/28/22), daily weight 433.3lbs (6/29/22), 434.5lbs (6/30/22), indicating weight gain 134.5lbs/31% in ~ 2 years. Pt states weight gain is due to the Covid pandemic. Pt considering Bariatric surgery; is aware he needs to be on a strict weight loss regimen prior to the procedure.    Pt recently diagnosed with DM ~ 2 months ago, A1c 8.5% (6/29/22). Pt taking Metformin at home; checks fingersticks daily, readings range from 117-148. Pt with elevated blood glucose in house, receiving Admelog.    Discussed with Pt the importance of consuming healthy well balance meals daily; consuming more fresh lean meats and vegetable; carbohydrate counting. Provided Pt with My Plate, Carbohydrate Counting for People with Diabetes, and Reading Label Tips Nutrition Education. Recommended Pt follow up with Outpatient Endocrinologist and Registered Dietitian. Pt verbalized understanding.

## 2022-06-30 NOTE — DIETITIAN INITIAL EVALUATION ADULT - SIGNS/SYMPTOMS
Pt newly diagnosed with Diabetes Mellitus, A1c 8.5% and BMI 65.9 Pt recently diagnosed with Diabetes Mellitus, A1c 8.5% and BMI 65.9

## 2022-06-30 NOTE — PROGRESS NOTE ADULT - SUBJECTIVE AND OBJECTIVE BOX
INTERVAL HPI/OVERNIGHT EVENTS: ***    PRESSORS: [ ] YES [ ] NO  WHICH:    Antimicrobial:    Cardiovascular:    Pulmonary:  ALBUTerol    90 MICROgram(s) HFA Inhaler 2 Puff(s) Inhalation every 6 hours  albuterol/ipratropium for Nebulization 3 milliLiter(s) Nebulizer every 6 hours  benzonatate 100 milliGRAM(s) Oral three times a day    Hematalogic:  enoxaparin Injectable 60 milliGRAM(s) SubCutaneous every 12 hours    Other:  acetaminophen     Tablet .. 650 milliGRAM(s) Oral every 6 hours PRN  chlorhexidine 2% Cloths 1 Application(s) Topical <User Schedule>  dextrose Oral Gel 15 Gram(s) Oral once PRN  insulin lispro (ADMELOG) corrective regimen sliding scale   SubCutaneous Before meals and at bedtime  polyethylene glycol 3350 17 Gram(s) Oral daily PRN  predniSONE   Tablet 40 milliGRAM(s) Oral daily  senna 2 Tablet(s) Oral at bedtime    acetaminophen     Tablet .. 650 milliGRAM(s) Oral every 6 hours PRN  ALBUTerol    90 MICROgram(s) HFA Inhaler 2 Puff(s) Inhalation every 6 hours  albuterol/ipratropium for Nebulization 3 milliLiter(s) Nebulizer every 6 hours  benzonatate 100 milliGRAM(s) Oral three times a day  chlorhexidine 2% Cloths 1 Application(s) Topical <User Schedule>  dextrose Oral Gel 15 Gram(s) Oral once PRN  enoxaparin Injectable 60 milliGRAM(s) SubCutaneous every 12 hours  insulin lispro (ADMELOG) corrective regimen sliding scale   SubCutaneous Before meals and at bedtime  polyethylene glycol 3350 17 Gram(s) Oral daily PRN  predniSONE   Tablet 40 milliGRAM(s) Oral daily  senna 2 Tablet(s) Oral at bedtime    Drug Dosing Weight  Height (cm): 172.7 (28 Jun 2022 06:32)  Weight (kg): 196.5 (28 Jun 2022 16:25)  BMI (kg/m2): 65.9 (28 Jun 2022 16:25)  BSA (m2): 2.84 (28 Jun 2022 16:25)    CENTRAL LINE: [ ] YES [ ] NO  LOCATION:   DATE INSERTED:  REMOVE: [ ] YES [ ] NO  EXPLAIN:    HORVATH: [ ] YES [ ] NO    DATE INSERTED:  REMOVE:  [ ] YES [ ] NO  EXPLAIN:    A-LINE:  [ ] YES [ ] NO  LOCATION:   DATE INSERTED:  REMOVE:  [ ] YES [ ] NO  EXPLAIN:    PMH -reviewed admission note, no change since admission  PAST MEDICAL & SURGICAL HISTORY:  Morbid obesity      Asthma      Pneumonia  , in 2019      2019 novel coronavirus disease (COVID-19)  , 2019      DM (diabetes mellitus)      TAL (obstructive sleep apnea)      No significant past surgical history          ICU Vital Signs Last 24 Hrs  T(C): 36.6 (30 Jun 2022 04:01), Max: 37.2 (30 Jun 2022 00:00)  T(F): 97.8 (30 Jun 2022 04:01), Max: 98.9 (30 Jun 2022 00:00)  HR: 67 (30 Jun 2022 07:00) (59 - 101)  BP: 133/66 (30 Jun 2022 07:00) (97/63 - 154/82)  BP(mean): 80 (30 Jun 2022 07:00) (70 - 117)  ABP: --  ABP(mean): --  RR: 15 (30 Jun 2022 07:00) (14 - 33)  SpO2: 94% (30 Jun 2022 07:00) (89% - 97%)      ABG - ( 30 Jun 2022 03:24 )  pH, Arterial: 7.44  pH, Blood: x     /  pCO2: 63    /  pO2: 68    / HCO3: 43    / Base Excess: 15.5  /  SaO2: 95                    06-29 @ 07:01  -  06-30 @ 07:00  --------------------------------------------------------  IN: 1890 mL / OUT: 1400 mL / NET: 490 mL            PHYSICAL EXAM:    GENERAL: NAD, well-groomed, well-developed  HEAD:  Atraumatic, Normocephalic  EYES: EOMI, PERRLA, conjunctiva and sclera clear  ENMT: No tonsillar erythema, exudates, or enlargement; Moist mucous membranes, Good dentition, No lesions  NECK: Supple, normal appearance, No JVD; Normal thyroid; Trachea midline  NERVOUS SYSTEM:  Alert & Oriented X3,  Motor Strength 5/5 B/L upper and lower extremities; DTRs 2+ intact and symmetric  CHEST/LUNG: No chest deformity; Normal percussion bilaterally; No rales, rhonchi, wheezing   HEART: Regular rate and rhythm; No murmurs, rubs, or gallops  ABDOMEN: Soft, Nontender, Nondistended; Bowel sounds present  EXTREMITIES:  2+ Peripheral Pulses, No clubbing, cyanosis, or edema  LYMPH: No lymphadenopathy noted  SKIN: No rashes or lesions;  Good capillary refill      LABS:  CBC Full  -  ( 30 Jun 2022 03:50 )  WBC Count : 12.57 K/uL  RBC Count : 4.89 M/uL  Hemoglobin : 13.7 g/dL  Hematocrit : 44.8 %  Platelet Count - Automated : 252 K/uL  Mean Cell Volume : 91.6 fl  Mean Cell Hemoglobin : 28.0 pg  Mean Cell Hemoglobin Concentration : 30.6 gm/dL  Auto Neutrophil # : 8.57 K/uL  Auto Lymphocyte # : 2.70 K/uL  Auto Monocyte # : 1.20 K/uL  Auto Eosinophil # : 0.01 K/uL  Auto Basophil # : 0.02 K/uL  Auto Neutrophil % : 68.1 %  Auto Lymphocyte % : 21.5 %  Auto Monocyte % : 9.5 %  Auto Eosinophil % : 0.1 %  Auto Basophil % : 0.2 %    06-30    138  |  97  |  16  ----------------------------<  115<H>  3.8   |  38<H>  |  0.62    Ca    8.9      30 Jun 2022 03:50  Phos  3.2     06-30  Mg     2.5     06-30    TPro  7.1  /  Alb  2.6<L>  /  TBili  0.2  /  DBili  x   /  AST  13  /  ALT  30  /  AlkPhos  51  06-30    PT/INR - ( 28 Jun 2022 09:57 )   PT: 12.2 sec;   INR: 1.02 ratio         PTT - ( 28 Jun 2022 09:57 )  PTT:30.3 sec        RADIOLOGY & ADDITIONAL STUDIES REVIEWED:  ***    [ ]GOALS OF CARE DISCUSSION WITH PATIENT/FAMILY/PROXY:    CRITICAL CARE TIME SPENT: 35 minutes INTERVAL HPI/OVERNIGHT EVENTS:  Patient seen and examined at bedside. No events overnight. Patient complaining of his cough is not improving. He also complained of constipation. He states his breathing is ok, and slept well with the BIPAP.     PRESSORS: [ ] YES [x ] NO  WHICH:    Antimicrobial:    Cardiovascular:    Pulmonary:  ALBUTerol    90 MICROgram(s) HFA Inhaler 2 Puff(s) Inhalation every 6 hours  albuterol/ipratropium for Nebulization 3 milliLiter(s) Nebulizer every 6 hours  benzonatate 100 milliGRAM(s) Oral three times a day    Hematalogic:  enoxaparin Injectable 60 milliGRAM(s) SubCutaneous every 12 hours    Other:  acetaminophen     Tablet .. 650 milliGRAM(s) Oral every 6 hours PRN  chlorhexidine 2% Cloths 1 Application(s) Topical <User Schedule>  dextrose Oral Gel 15 Gram(s) Oral once PRN  insulin lispro (ADMELOG) corrective regimen sliding scale   SubCutaneous Before meals and at bedtime  polyethylene glycol 3350 17 Gram(s) Oral daily PRN  predniSONE   Tablet 40 milliGRAM(s) Oral daily  senna 2 Tablet(s) Oral at bedtime    acetaminophen     Tablet .. 650 milliGRAM(s) Oral every 6 hours PRN  ALBUTerol    90 MICROgram(s) HFA Inhaler 2 Puff(s) Inhalation every 6 hours  albuterol/ipratropium for Nebulization 3 milliLiter(s) Nebulizer every 6 hours  benzonatate 100 milliGRAM(s) Oral three times a day  chlorhexidine 2% Cloths 1 Application(s) Topical <User Schedule>  dextrose Oral Gel 15 Gram(s) Oral once PRN  enoxaparin Injectable 60 milliGRAM(s) SubCutaneous every 12 hours  insulin lispro (ADMELOG) corrective regimen sliding scale   SubCutaneous Before meals and at bedtime  polyethylene glycol 3350 17 Gram(s) Oral daily PRN  predniSONE   Tablet 40 milliGRAM(s) Oral daily  senna 2 Tablet(s) Oral at bedtime    Drug Dosing Weight  Height (cm): 172.7 (28 Jun 2022 06:32)  Weight (kg): 196.5 (28 Jun 2022 16:25)  BMI (kg/m2): 65.9 (28 Jun 2022 16:25)  BSA (m2): 2.84 (28 Jun 2022 16:25)    CENTRAL LINE: [ ] YES [x ] NO  LOCATION:   DATE INSERTED:  REMOVE: [ ] YES [ ] NO  EXPLAIN:    HORVATH: [ ] YES [ x] NO    DATE INSERTED:  REMOVE:  [ ] YES [ ] NO  EXPLAIN:    A-LINE:  [ ] YES [x ] NO  LOCATION:   DATE INSERTED:  REMOVE:  [ ] YES [ ] NO  EXPLAIN:    PMH -reviewed admission note, no change since admission  PAST MEDICAL & SURGICAL HISTORY:  Morbid obesity      Asthma      Pneumonia  , in 2019      2019 novel coronavirus disease (COVID-19)  , 2019      DM (diabetes mellitus)      TAL (obstructive sleep apnea)      No significant past surgical history          ICU Vital Signs Last 24 Hrs  T(C): 36.6 (30 Jun 2022 04:01), Max: 37.2 (30 Jun 2022 00:00)  T(F): 97.8 (30 Jun 2022 04:01), Max: 98.9 (30 Jun 2022 00:00)  HR: 67 (30 Jun 2022 07:00) (59 - 101)  BP: 133/66 (30 Jun 2022 07:00) (97/63 - 154/82)  BP(mean): 80 (30 Jun 2022 07:00) (70 - 117)  ABP: --  ABP(mean): --  RR: 15 (30 Jun 2022 07:00) (14 - 33)  SpO2: 94% (30 Jun 2022 07:00) (89% - 97%)      ABG - ( 30 Jun 2022 03:24 )  pH, Arterial: 7.44  pH, Blood: x     /  pCO2: 63    /  pO2: 68    / HCO3: 43    / Base Excess: 15.5  /  SaO2: 95                    06-29 @ 07:01  -  06-30 @ 07:00  --------------------------------------------------------  IN: 1890 mL / OUT: 1400 mL / NET: 490 mL            PHYSICAL EXAM:    GENERAL: obese, sweating,   HEAD:  Atraumatic, Normocephalic  EYES: EOMI, PERRLA, conjunctiva and sclera clear  ENMT: No tonsillar erythema, exudates, or enlargement; Moist mucous membranes, Good dentition, No lesions  NECK: large neck circumference, Supple, normal appearance, No JVD; Normal thyroid; Trachea midline  NERVOUS SYSTEM:  Alert & Oriented X3,  Motor Strength 5/5 B/L upper and lower extremities; DTRs 2+ intact and symmetric  CHEST/LUNG: Expiratory wheezing bilaterally  HEART: Regular rate and rhythm; No murmurs, rubs, or gallops  ABDOMEN: Soft, Nontender, Nondistended; Bowel sounds present  EXTREMITIES:  2+ Peripheral Pulses, No clubbing, cyanosis, + 1 edema bl  LYMPH: No lymphadenopathy noted  SKIN: No rashes or lesions;  Good capillary refill      LABS:  CBC Full  -  ( 30 Jun 2022 03:50 )  WBC Count : 12.57 K/uL  RBC Count : 4.89 M/uL  Hemoglobin : 13.7 g/dL  Hematocrit : 44.8 %  Platelet Count - Automated : 252 K/uL  Mean Cell Volume : 91.6 fl  Mean Cell Hemoglobin : 28.0 pg  Mean Cell Hemoglobin Concentration : 30.6 gm/dL  Auto Neutrophil # : 8.57 K/uL  Auto Lymphocyte # : 2.70 K/uL  Auto Monocyte # : 1.20 K/uL  Auto Eosinophil # : 0.01 K/uL  Auto Basophil # : 0.02 K/uL  Auto Neutrophil % : 68.1 %  Auto Lymphocyte % : 21.5 %  Auto Monocyte % : 9.5 %  Auto Eosinophil % : 0.1 %  Auto Basophil % : 0.2 %    06-30    138  |  97  |  16  ----------------------------<  115<H>  3.8   |  38<H>  |  0.62    Ca    8.9      30 Jun 2022 03:50  Phos  3.2     06-30  Mg     2.5     06-30    TPro  7.1  /  Alb  2.6<L>  /  TBili  0.2  /  DBili  x   /  AST  13  /  ALT  30  /  AlkPhos  51  06-30    PT/INR - ( 28 Jun 2022 09:57 )   PT: 12.2 sec;   INR: 1.02 ratio         PTT - ( 28 Jun 2022 09:57 )  PTT:30.3 sec        RADIOLOGY & ADDITIONAL STUDIES REVIEWED:  ***    [ ]GOALS OF CARE DISCUSSION WITH PATIENT/FAMILY/PROXY:    CRITICAL CARE TIME SPENT: 35 minutes

## 2022-06-30 NOTE — DIETITIAN INITIAL EVALUATION ADULT - ADD RECOMMEND
1. Continue current diet order. 2. Monitor PO intake, weights, labs, skin integrity, and GI status. Diet education given. MD to monitor. RD available.

## 2022-06-30 NOTE — DIETITIAN INITIAL EVALUATION ADULT - OTHER CALCULATIONS
Energy/protein needs based on Pt's adjusted .5kg.  Fluid needs deferred to medical team. Energy/protein needs based on Pt's IBW 76.8kg.  Fluid needs deferred to medical team.

## 2022-06-30 NOTE — DIETITIAN INITIAL EVALUATION ADULT - REASON FOR ADMISSION
Acute respiratory failure, unspecified whether with hypoxia or hypercapnia     Per chart, Pt is 36M from home w/ morbid obesity and PMH of TAL (awaiting CPAP) and DM, presents with SOB x5 days. Pt returned to the ED with cough and sob. Pt with increased sputum production. Pt was at [facility] 6/26 and left AMA. Pt with bouts of coughing and difficulty breathing during this time. Pt with streaks of blood and nausea. Pt admitted for Acute respiratory failure, unspecified whether with hypoxia or hypercapnia."     Per chart, "Pt is 36M from home w/ morbid obesity and PMH of TAL (awaiting CPAP) and DM (recent diagnosis), presents with SOB x5 days. Pt returned to the ED with cough and sob. Pt with increased sputum production. Pt was at [facility] 6/26 and left AMA. Pt with bouts of coughing and difficulty breathing during this time. Pt with streaks of blood and nausea. Pt admitted for Acute respiratory failure, unspecified whether with hypoxia or hypercapnia."

## 2022-06-30 NOTE — CHART NOTE - NSCHARTNOTEFT_GEN_A_CORE
36M from home w/ morbid obesity and PMH of TAL (awaiting CPAP) and DM, presents with SOB x5 days. Pt returned to the ed with cough and sob. Pt with increased sputum production. Pt was at [facility] 6/26 and left AMA. Pt with bouts of coughing and difficulty breathing during this time. Pt with streaks of blood. He endorses nausea. He denies fever, chills, vomiting, chest pain, abdominal pain, urinary or bowel movement changes.     Patient abg after admission showed worsening of hypercapnia, and patient was transferred to ICU for Bipap due to acute hypercapnic respiratory failure 2/2 to Human Metapneumovirus. ABG improved after initiation of bipap. Outpatient records indicate patient fitted for BIPAP for TAL, settings  22/18. Patient tolerating 14/6 while in ICU, continuing this settings for now at night. H. Metapneumovirus being treated with Duoneb, prednisone, Robitussin.     Patient stable at this time. Pt signed out to Attending Dr. jurado, and Resident Dr. RANDXX    F/u:  Continue BIPAP at night time  f/u with Dr. Montes for TAL and BIPAP machine delivery   consult for BIPAP machine delivery

## 2022-06-30 NOTE — DIETITIAN INITIAL EVALUATION ADULT - PERTINENT MEDS FT
MEDICATIONS  (STANDING):  ALBUTerol    90 MICROgram(s) HFA Inhaler 2 Puff(s) Inhalation every 6 hours  albuterol/ipratropium for Nebulization 3 milliLiter(s) Nebulizer every 6 hours  benzonatate 100 milliGRAM(s) Oral three times a day  chlorhexidine 2% Cloths 1 Application(s) Topical <User Schedule>  enoxaparin Injectable 60 milliGRAM(s) SubCutaneous every 12 hours  insulin lispro (ADMELOG) corrective regimen sliding scale   SubCutaneous Before meals and at bedtime  predniSONE   Tablet 40 milliGRAM(s) Oral daily  senna 2 Tablet(s) Oral at bedtime    MEDICATIONS  (PRN):  acetaminophen     Tablet .. 650 milliGRAM(s) Oral every 6 hours PRN Temp greater or equal to 38C (100.4F), Moderate Pain (4 - 6)  dextrose Oral Gel 15 Gram(s) Oral once PRN Blood Glucose LESS THAN 70 milliGRAM(s)/deciliter  guaiFENesin Oral Liquid (Sugar-Free) 200 milliGRAM(s) Oral every 6 hours PRN Cough  polyethylene glycol 3350 17 Gram(s) Oral daily PRN Constipation

## 2022-06-30 NOTE — DIETITIAN INITIAL EVALUATION ADULT - ETIOLOGY
Lack of prior knowledge to nutrition related  education  Limited exposure to nutrition-related education

## 2022-06-30 NOTE — DIETITIAN INITIAL EVALUATION ADULT - ORAL INTAKE PTA/DIET HISTORY
Visited Pt, reports appetite/PO intake has decreased to 75% of meals ~ 2 weeks due to acute illness. Pt does not do much cooking at homes; reports eating out frequently, generally Bengali or fast foods.   Pt has NFKA. Endorses taking Vitamin D; denies any oral nutrition supplementation.  Visited Pt, reports a decrease in usual appetite/PO intake to 75% of meals ~ 2 weeks due to acute illness. Pt does not do much cooking at homes; reports eating out frequently, generally [place] or fast foods.   Pt has NFKA. Endorses taking Vitamin D; denies any oral nutrition supplementation.

## 2022-06-30 NOTE — DIETITIAN INITIAL EVALUATION ADULT - PERTINENT LABORATORY DATA
06-30    138  |  97  |  16  ----------------------------<  115<H>  3.8   |  38<H>  |  0.62    Ca    8.9      30 Jun 2022 03:50  Phos  3.2     06-30  Mg     2.5     06-30    TPro  7.1  /  Alb  2.6<L>  /  TBili  0.2  /  DBili  x   /  AST  13  /  ALT  30  /  AlkPhos  51  06-30  POCT Blood Glucose.: 100 mg/dL (06-30-22 @ 06:12)  A1C with Estimated Average Glucose Result: 8.5 % (06-29-22 @ 08:58)

## 2022-06-30 NOTE — DIETITIAN INITIAL EVALUATION ADULT - LITERATURE/VIDEOS GIVEN
My Plate, Carbohydrate Counting for People with Diabetes, and Reading Label Tips Nutrition Education.

## 2022-06-30 NOTE — PROGRESS NOTE ADULT - ASSESSMENT
36M from home w/ morbid obesity and PMH of TAL (awaiting CPAP) and DM, presents with SOB x5 days    Assessment:  # AHRF due to Human metapneumovirus  # DM    Plan:  Neuro:  No Issues    Cardiovascular:  No issues    Pulmonary:   #AHRF due to viral infection  - Solumedrol 40 q12 to Prednisone 40 x5 days 6/29-7/4  - Duonebs  - Albuterol  - Tessalon perles q8  - was on BIPAP 6/28, now on NRB, titrate down as tolerated      #TAL  - BIPAP 22/18 as per outpatient sleep study  - 85% at rest, lowest 68% on room air during sleep study  - BIPAP QHS    Gastrointestinal:  Diabetic diet    Renal:  No issues    Infectious Diseases:  # Human Metapneumovirus infection  - supportive care    Heme/onc:   No issues    Endo:   # DM  - A1c 8.5%  - Sliding scale  - FS ACHS    Skin/ catheter:   N/A    Prophylaxis:   Lovenox 40 sq qd    Goals of Care:   Full Code    Dispo:  Admit to ICU  36M from home w/ morbid obesity and PMH of TAL (awaiting CPAP) and DM, presents with SOB x5 days    Assessment:  # AHRF due to Human metapneumovirus  # DM    Plan:  Neuro:  No Issues    Cardiovascular:  No issues    Pulmonary:   #AHRF due to viral infection  - Solumedrol 40 q12 to Prednisone 40 x5 days 6/29-7/4  - Duonebs  - Albuterol  - Tessalon perles q8, add robitusin prn  - NC during day, BIPAP at night      #TAL  - BIPAP 22/18 as per outpatient sleep study  - 85% at rest, lowest 68% on room air during sleep study  - BIPAP QHS 14/6  - F/u with Dr. Montes  -  consult for BIPAP delivery    Gastrointestinal:  Diabetic diet  # constipation - resolved  - s/p stool softener with bm.    Renal:  No issues    Infectious Diseases:  # Human Metapneumovirus infection  - supportive care  - plan as above    Heme/onc:   No issues    Endo:   # DM  - A1c 8.5%  - Sliding scale  - FS ACHS  - Consider starting Ozempic outpatient for wt loss and diabetes.     Skin/ catheter:   N/A    Prophylaxis:   Lovenox 60bid    Goals of Care:   Full Code    Dispo:  Admit to ICU

## 2022-07-01 DIAGNOSIS — J20.8 ACUTE BRONCHITIS DUE TO OTHER SPECIFIED ORGANISMS: ICD-10-CM

## 2022-07-01 DIAGNOSIS — J96.21 ACUTE AND CHRONIC RESPIRATORY FAILURE WITH HYPOXIA: ICD-10-CM

## 2022-07-01 DIAGNOSIS — E11.9 TYPE 2 DIABETES MELLITUS WITHOUT COMPLICATIONS: ICD-10-CM

## 2022-07-01 DIAGNOSIS — Z71.89 OTHER SPECIFIED COUNSELING: ICD-10-CM

## 2022-07-01 DIAGNOSIS — E66.01 MORBID (SEVERE) OBESITY DUE TO EXCESS CALORIES: ICD-10-CM

## 2022-07-01 DIAGNOSIS — G47.33 OBSTRUCTIVE SLEEP APNEA (ADULT) (PEDIATRIC): ICD-10-CM

## 2022-07-01 DIAGNOSIS — J45.909 UNSPECIFIED ASTHMA, UNCOMPLICATED: ICD-10-CM

## 2022-07-01 LAB
ALBUMIN SERPL ELPH-MCNC: 2.6 G/DL — LOW (ref 3.5–5)
ALP SERPL-CCNC: 55 U/L — SIGNIFICANT CHANGE UP (ref 40–120)
ALT FLD-CCNC: 34 U/L DA — SIGNIFICANT CHANGE UP (ref 10–60)
ANION GAP SERPL CALC-SCNC: 6 MMOL/L — SIGNIFICANT CHANGE UP (ref 5–17)
AST SERPL-CCNC: 20 U/L — SIGNIFICANT CHANGE UP (ref 10–40)
BASOPHILS # BLD AUTO: 0.03 K/UL — SIGNIFICANT CHANGE UP (ref 0–0.2)
BASOPHILS NFR BLD AUTO: 0.3 % — SIGNIFICANT CHANGE UP (ref 0–2)
BILIRUB SERPL-MCNC: 0.4 MG/DL — SIGNIFICANT CHANGE UP (ref 0.2–1.2)
BUN SERPL-MCNC: 17 MG/DL — SIGNIFICANT CHANGE UP (ref 7–18)
CALCIUM SERPL-MCNC: 8.9 MG/DL — SIGNIFICANT CHANGE UP (ref 8.4–10.5)
CHLORIDE SERPL-SCNC: 97 MMOL/L — SIGNIFICANT CHANGE UP (ref 96–108)
CO2 SERPL-SCNC: 37 MMOL/L — HIGH (ref 22–31)
CREAT SERPL-MCNC: 0.76 MG/DL — SIGNIFICANT CHANGE UP (ref 0.5–1.3)
CULTURE RESULTS: SIGNIFICANT CHANGE UP
CULTURE RESULTS: SIGNIFICANT CHANGE UP
EGFR: 120 ML/MIN/1.73M2 — SIGNIFICANT CHANGE UP
EOSINOPHIL # BLD AUTO: 0.11 K/UL — SIGNIFICANT CHANGE UP (ref 0–0.5)
EOSINOPHIL NFR BLD AUTO: 1 % — SIGNIFICANT CHANGE UP (ref 0–6)
GLUCOSE BLDC GLUCOMTR-MCNC: 101 MG/DL — HIGH (ref 70–99)
GLUCOSE BLDC GLUCOMTR-MCNC: 107 MG/DL — HIGH (ref 70–99)
GLUCOSE BLDC GLUCOMTR-MCNC: 127 MG/DL — HIGH (ref 70–99)
GLUCOSE BLDC GLUCOMTR-MCNC: 207 MG/DL — HIGH (ref 70–99)
GLUCOSE SERPL-MCNC: 106 MG/DL — HIGH (ref 70–99)
HCT VFR BLD CALC: 45 % — SIGNIFICANT CHANGE UP (ref 39–50)
HGB BLD-MCNC: 14 G/DL — SIGNIFICANT CHANGE UP (ref 13–17)
IMM GRANULOCYTES NFR BLD AUTO: 0.4 % — SIGNIFICANT CHANGE UP (ref 0–1.5)
LYMPHOCYTES # BLD AUTO: 28.5 % — SIGNIFICANT CHANGE UP (ref 13–44)
LYMPHOCYTES # BLD AUTO: 3.11 K/UL — SIGNIFICANT CHANGE UP (ref 1–3.3)
MAGNESIUM SERPL-MCNC: 2.4 MG/DL — SIGNIFICANT CHANGE UP (ref 1.6–2.6)
MCHC RBC-ENTMCNC: 28.2 PG — SIGNIFICANT CHANGE UP (ref 27–34)
MCHC RBC-ENTMCNC: 31.1 GM/DL — LOW (ref 32–36)
MCV RBC AUTO: 90.7 FL — SIGNIFICANT CHANGE UP (ref 80–100)
MONOCYTES # BLD AUTO: 0.74 K/UL — SIGNIFICANT CHANGE UP (ref 0–0.9)
MONOCYTES NFR BLD AUTO: 6.8 % — SIGNIFICANT CHANGE UP (ref 2–14)
NEUTROPHILS # BLD AUTO: 6.88 K/UL — SIGNIFICANT CHANGE UP (ref 1.8–7.4)
NEUTROPHILS NFR BLD AUTO: 63 % — SIGNIFICANT CHANGE UP (ref 43–77)
NRBC # BLD: 0 /100 WBCS — SIGNIFICANT CHANGE UP (ref 0–0)
PHOSPHATE SERPL-MCNC: 4.2 MG/DL — SIGNIFICANT CHANGE UP (ref 2.5–4.5)
PLATELET # BLD AUTO: 239 K/UL — SIGNIFICANT CHANGE UP (ref 150–400)
POTASSIUM SERPL-MCNC: 4 MMOL/L — SIGNIFICANT CHANGE UP (ref 3.5–5.3)
POTASSIUM SERPL-SCNC: 4 MMOL/L — SIGNIFICANT CHANGE UP (ref 3.5–5.3)
PROT SERPL-MCNC: 7.2 G/DL — SIGNIFICANT CHANGE UP (ref 6–8.3)
RBC # BLD: 4.96 M/UL — SIGNIFICANT CHANGE UP (ref 4.2–5.8)
RBC # FLD: 15.5 % — HIGH (ref 10.3–14.5)
SODIUM SERPL-SCNC: 140 MMOL/L — SIGNIFICANT CHANGE UP (ref 135–145)
SPECIMEN SOURCE: SIGNIFICANT CHANGE UP
SPECIMEN SOURCE: SIGNIFICANT CHANGE UP
WBC # BLD: 10.91 K/UL — HIGH (ref 3.8–10.5)
WBC # FLD AUTO: 10.91 K/UL — HIGH (ref 3.8–10.5)

## 2022-07-01 RX ORDER — PANTOPRAZOLE SODIUM 20 MG/1
40 TABLET, DELAYED RELEASE ORAL
Refills: 0 | Status: DISCONTINUED | OUTPATIENT
Start: 2022-07-01 | End: 2022-07-04

## 2022-07-01 RX ORDER — ALBUTEROL 90 UG/1
2 AEROSOL, METERED ORAL EVERY 6 HOURS
Refills: 0 | Status: DISCONTINUED | OUTPATIENT
Start: 2022-07-01 | End: 2022-07-06

## 2022-07-01 RX ADMIN — Medication 3 MILLILITER(S): at 08:55

## 2022-07-01 RX ADMIN — Medication 2: at 11:29

## 2022-07-01 RX ADMIN — Medication 40 MILLIGRAM(S): at 06:56

## 2022-07-01 RX ADMIN — CHLORHEXIDINE GLUCONATE 1 APPLICATION(S): 213 SOLUTION TOPICAL at 06:55

## 2022-07-01 RX ADMIN — ENOXAPARIN SODIUM 60 MILLIGRAM(S): 100 INJECTION SUBCUTANEOUS at 17:25

## 2022-07-01 RX ADMIN — Medication 100 MILLIGRAM(S): at 13:17

## 2022-07-01 RX ADMIN — Medication 3 MILLILITER(S): at 04:21

## 2022-07-01 RX ADMIN — ALBUTEROL 2 PUFF(S): 90 AEROSOL, METERED ORAL at 15:09

## 2022-07-01 RX ADMIN — Medication 100 MILLIGRAM(S): at 21:30

## 2022-07-01 RX ADMIN — Medication 100 MILLIGRAM(S): at 06:56

## 2022-07-01 RX ADMIN — ENOXAPARIN SODIUM 60 MILLIGRAM(S): 100 INJECTION SUBCUTANEOUS at 06:56

## 2022-07-01 NOTE — PROGRESS NOTE ADULT - SUBJECTIVE AND OBJECTIVE BOX
Patient is a 36y old  Male who presents with a chief complaint of Per chart, "Pt is 36M from home w/ morbid obesity and PMH of TAL (awaiting CPAP) and DM (recent diagnosis), presents with SOB x5 days. Pt returned to the ED with cough and sob. Pt with increased sputum production. Pt was at [facility] 6/26 and left AMA. Pt with bouts of coughing and difficulty breathing during this time. Pt with streaks of blood and nausea. Pt admitted for Acute respiratory failure, unspecified whether with hypoxia or hypercapnia."     (30 Jun 2022 10:25)    INTERVAL HISTORY/ OVERNIGHT EVENTS: Patient was examined while he was lying in bed, Aox3 (time, place, person), responding to questions/commands, in NAD/ventilated/O2 by NC. He denies headache, weakness, cough, chest pain, wheezing, abdominal pain. Bowel movement's, Horvath catheter placed. He is tolerating ___ diet with no nausea or vomiting.      PRESSORS: [ ] YES [x ] NO    Antimicrobial:    Cardiovascular:    Pulmonary:  ALBUTerol    90 MICROgram(s) HFA Inhaler 2 Puff(s) Inhalation every 6 hours  ALBUTerol    90 MICROgram(s) HFA Inhaler 2 Puff(s) Inhalation every 6 hours  benzonatate 100 milliGRAM(s) Oral three times a day  guaiFENesin Oral Liquid (Sugar-Free) 200 milliGRAM(s) Oral every 6 hours PRN    Hematalogic:  enoxaparin Injectable 60 milliGRAM(s) SubCutaneous every 12 hours    Other:  acetaminophen     Tablet .. 650 milliGRAM(s) Oral every 6 hours PRN  chlorhexidine 2% Cloths 1 Application(s) Topical <User Schedule>  insulin lispro (ADMELOG) corrective regimen sliding scale   SubCutaneous Before meals and at bedtime  pantoprazole    Tablet 40 milliGRAM(s) Oral before breakfast  polyethylene glycol 3350 17 Gram(s) Oral daily PRN  predniSONE   Tablet 40 milliGRAM(s) Oral daily  senna 2 Tablet(s) Oral at bedtime    acetaminophen     Tablet .. 650 milliGRAM(s) Oral every 6 hours PRN  ALBUTerol    90 MICROgram(s) HFA Inhaler 2 Puff(s) Inhalation every 6 hours  ALBUTerol    90 MICROgram(s) HFA Inhaler 2 Puff(s) Inhalation every 6 hours  benzonatate 100 milliGRAM(s) Oral three times a day  chlorhexidine 2% Cloths 1 Application(s) Topical <User Schedule>  enoxaparin Injectable 60 milliGRAM(s) SubCutaneous every 12 hours  guaiFENesin Oral Liquid (Sugar-Free) 200 milliGRAM(s) Oral every 6 hours PRN  insulin lispro (ADMELOG) corrective regimen sliding scale   SubCutaneous Before meals and at bedtime  pantoprazole    Tablet 40 milliGRAM(s) Oral before breakfast  polyethylene glycol 3350 17 Gram(s) Oral daily PRN  predniSONE   Tablet 40 milliGRAM(s) Oral daily  senna 2 Tablet(s) Oral at bedtime    Drug Dosing Weight  Height (cm): 172.7 (28 Jun 2022 06:32)  Weight (kg): 196.5 (28 Jun 2022 16:25)  BMI (kg/m2): 65.9 (28 Jun 2022 16:25)  BSA (m2): 2.84 (28 Jun 2022 16:25)    CENTRAL LINE: [ ] YES [x ] NO  LOCATION:     HORVATH: [ ] YES [x ] NO      A-LINE:  [ ] YES [x ] NO  LOCATION:         ICU Vital Signs Last 24 Hrs  T(C): 36 (01 Jul 2022 08:00), Max: 36.8 (01 Jul 2022 00:00)  T(F): 96.8 (01 Jul 2022 08:00), Max: 98.2 (01 Jul 2022 00:00)  HR: 91 (01 Jul 2022 11:00) (64 - 108)  BP: 131/74 (01 Jul 2022 11:00) (104/62 - 145/76)  BP(mean): 84 (01 Jul 2022 11:00) (63 - 98)  ABP: --  ABP(mean): --  RR: 22 (01 Jul 2022 11:00) (15 - 28)  SpO2: 91% (01 Jul 2022 11:00) (84% - 97%)      ABG - ( 30 Jun 2022 11:12 )  pH, Arterial: 7.45  pH, Blood: x     /  pCO2: 60    /  pO2: 70    / HCO3: 42    / Base Excess: 14.8  /  SaO2: 95                    06-30 @ 07:01  -  07-01 @ 07:00  --------------------------------------------------------  IN: 360 mL / OUT: 1225 mL / NET: -865 mL            PHYSICAL EXAM:    GENERAL: NAD, well-groomed, well-developed  EYES: EOMI, PERRLA,   NECK: Supple, No JVD; Normal thyroid; Trachea midline  NERVOUS SYSTEM:  Alert & Oriented X3,  Motor Strength 5/5 B/L upper and lower extremities; DTRs 2+ intact and symmetric  CHEST/LUNG: No rales, rhonchi, wheezing   HEART: Regular rate and rhythm; No murmurs,   ABDOMEN: Soft, Nontender, Nondistended; Bowel sounds present  EXTREMITIES:  2+ Peripheral Pulses, No clubbing, cyanosis, or edema      LABS:                        14.0   10.91 )-----------( 239      ( 01 Jul 2022 04:34 )             45.0     07-01    140  |  97  |  17  ----------------------------<  106<H>  4.0   |  37<H>  |  0.76    Ca    8.9      01 Jul 2022 04:34  Phos  4.2     07-01  Mg     2.4     07-01    TPro  7.2  /  Alb  2.6<L>  /  TBili  0.4  /  DBili  x   /  AST  20  /  ALT  34  /  AlkPhos  55  07-01        CAPILLARY BLOOD GLUCOSE      POCT Blood Glucose.: 207 mg/dL (01 Jul 2022 11:28)  POCT Blood Glucose.: 107 mg/dL (01 Jul 2022 08:10)  POCT Blood Glucose.: 132 mg/dL (30 Jun 2022 22:02)  POCT Blood Glucose.: 152 mg/dL (30 Jun 2022 16:59)        RADIOLOGY & ADDITIONAL STUDIES REVIEWED:  ***     Patient is a 36y old  Male who presents with a chief complaint of Per chart, "Pt is 36M from home w/ morbid obesity and PMH of TAL (awaiting CPAP) and DM (recent diagnosis), presents with SOB x5 days. Pt returned to the ED with cough and sob. Pt with increased sputum production. Pt was at [facility] 6/26 and left AMA. Pt with bouts of coughing and difficulty breathing during this time. Pt with streaks of blood and nausea. Pt admitted for Acute respiratory failure, unspecified whether with hypoxia or hypercapnia."     (30 Jun 2022 10:25)    INTERVAL HISTORY/ OVERNIGHT EVENTS: Patient was examined while he was lying in bed, Aox3 (time, place, person), responding to questions/commands, in NAD/ventilated/O2 by NC. He denies headache, weakness, cough, chest pain, wheezing, abdominal pain. Bowel movement's, Horvath catheter placed. He is tolerating consistant carbohyddrate diet with no nausea or vomiting.      PRESSORS: [ ] YES [x ] NO    Antimicrobial:    Cardiovascular:    Pulmonary:  ALBUTerol    90 MICROgram(s) HFA Inhaler 2 Puff(s) Inhalation every 6 hours  ALBUTerol    90 MICROgram(s) HFA Inhaler 2 Puff(s) Inhalation every 6 hours  benzonatate 100 milliGRAM(s) Oral three times a day  guaiFENesin Oral Liquid (Sugar-Free) 200 milliGRAM(s) Oral every 6 hours PRN    Hematalogic:  enoxaparin Injectable 60 milliGRAM(s) SubCutaneous every 12 hours    Other:  acetaminophen     Tablet .. 650 milliGRAM(s) Oral every 6 hours PRN  chlorhexidine 2% Cloths 1 Application(s) Topical <User Schedule>  insulin lispro (ADMELOG) corrective regimen sliding scale   SubCutaneous Before meals and at bedtime  pantoprazole    Tablet 40 milliGRAM(s) Oral before breakfast  polyethylene glycol 3350 17 Gram(s) Oral daily PRN  predniSONE   Tablet 40 milliGRAM(s) Oral daily  senna 2 Tablet(s) Oral at bedtime    acetaminophen     Tablet .. 650 milliGRAM(s) Oral every 6 hours PRN  ALBUTerol    90 MICROgram(s) HFA Inhaler 2 Puff(s) Inhalation every 6 hours  ALBUTerol    90 MICROgram(s) HFA Inhaler 2 Puff(s) Inhalation every 6 hours  benzonatate 100 milliGRAM(s) Oral three times a day  chlorhexidine 2% Cloths 1 Application(s) Topical <User Schedule>  enoxaparin Injectable 60 milliGRAM(s) SubCutaneous every 12 hours  guaiFENesin Oral Liquid (Sugar-Free) 200 milliGRAM(s) Oral every 6 hours PRN  insulin lispro (ADMELOG) corrective regimen sliding scale   SubCutaneous Before meals and at bedtime  pantoprazole    Tablet 40 milliGRAM(s) Oral before breakfast  polyethylene glycol 3350 17 Gram(s) Oral daily PRN  predniSONE   Tablet 40 milliGRAM(s) Oral daily  senna 2 Tablet(s) Oral at bedtime    Drug Dosing Weight  Height (cm): 172.7 (28 Jun 2022 06:32)  Weight (kg): 196.5 (28 Jun 2022 16:25)  BMI (kg/m2): 65.9 (28 Jun 2022 16:25)  BSA (m2): 2.84 (28 Jun 2022 16:25)    CENTRAL LINE: [ ] YES [x ] NO  LOCATION:     HORVATH: [ ] YES [x ] NO      A-LINE:  [ ] YES [x ] NO  LOCATION:         ICU Vital Signs Last 24 Hrs  T(C): 36 (01 Jul 2022 08:00), Max: 36.8 (01 Jul 2022 00:00)  T(F): 96.8 (01 Jul 2022 08:00), Max: 98.2 (01 Jul 2022 00:00)  HR: 91 (01 Jul 2022 11:00) (64 - 108)  BP: 131/74 (01 Jul 2022 11:00) (104/62 - 145/76)  BP(mean): 84 (01 Jul 2022 11:00) (63 - 98)  ABP: --  ABP(mean): --  RR: 22 (01 Jul 2022 11:00) (15 - 28)  SpO2: 91% (01 Jul 2022 11:00) (84% - 97%)      ABG - ( 30 Jun 2022 11:12 )  pH, Arterial: 7.45  pH, Blood: x     /  pCO2: 60    /  pO2: 70    / HCO3: 42    / Base Excess: 14.8  /  SaO2: 95                    06-30 @ 07:01  -  07-01 @ 07:00  --------------------------------------------------------  IN: 360 mL / OUT: 1225 mL / NET: -865 mL            PHYSICAL EXAM:    GENERAL: NAD, well-groomed, well-developed  EYES: EOMI, PERRLA,   NECK: Supple, No JVD; Normal thyroid; Trachea midline  NERVOUS SYSTEM:  Alert & Oriented X3,  Motor Strength 5/5 B/L upper and lower extremities; DTRs 2+ intact and symmetric  CHEST/LUNG: No rales, rhonchi, wheezing   HEART: Regular rate and rhythm; No murmurs,   ABDOMEN: Soft, Nontender, Nondistended; Bowel sounds present  EXTREMITIES:  2+ Peripheral Pulses, No clubbing, cyanosis, or edema      LABS:                        14.0   10.91 )-----------( 239      ( 01 Jul 2022 04:34 )             45.0     07-01    140  |  97  |  17  ----------------------------<  106<H>  4.0   |  37<H>  |  0.76    Ca    8.9      01 Jul 2022 04:34  Phos  4.2     07-01  Mg     2.4     07-01    TPro  7.2  /  Alb  2.6<L>  /  TBili  0.4  /  DBili  x   /  AST  20  /  ALT  34  /  AlkPhos  55  07-01        CAPILLARY BLOOD GLUCOSE      POCT Blood Glucose.: 207 mg/dL (01 Jul 2022 11:28)  POCT Blood Glucose.: 107 mg/dL (01 Jul 2022 08:10)  POCT Blood Glucose.: 132 mg/dL (30 Jun 2022 22:02)  POCT Blood Glucose.: 152 mg/dL (30 Jun 2022 16:59)        RADIOLOGY & ADDITIONAL STUDIES REVIEWED:  ***     Pt is 36M from home w/ morbid obesity and PMH of TAL (awaiting CPAP) and DM (recent diagnosis), presents with SOB x5 days. Pt returned to the ED with cough and sob. Pt with increased sputum production. Pt was at [facility] 6/26 and left AMA. Pt with bouts of coughing and difficulty breathing during this time. Pt with streaks of blood and nausea. Pt admitted for Acute respiratory failure, unspecified whether with hypoxia or hypercapnia.     (30 Jun 2022 10:25)    INTERVAL HISTORY/ OVERNIGHT EVENTS: Patient was examined while he was lying in bed, Aox3 (time, place, person), responding to questions/commands, in NAD/O2 by NC. He denies headache, weakness, cough, chest pain, wheezing, abdominal pain. Pt admits having bowel movements, and is voiding. He is tolerating a consistent carbohydrate diet with no nausea or vomiting.      PRESSORS: [ ] YES [x ] NO    Antimicrobial:    Cardiovascular:    Pulmonary:  ALBUTerol    90 MICROgram(s) HFA Inhaler 2 Puff(s) Inhalation every 6 hours  ALBUTerol    90 MICROgram(s) HFA Inhaler 2 Puff(s) Inhalation every 6 hours  benzonatate 100 milliGRAM(s) Oral three times a day  guaiFENesin Oral Liquid (Sugar-Free) 200 milliGRAM(s) Oral every 6 hours PRN    Hematalogic:  enoxaparin Injectable 60 milliGRAM(s) SubCutaneous every 12 hours    Other:  acetaminophen     Tablet .. 650 milliGRAM(s) Oral every 6 hours PRN  chlorhexidine 2% Cloths 1 Application(s) Topical <User Schedule>  insulin lispro (ADMELOG) corrective regimen sliding scale   SubCutaneous Before meals and at bedtime  pantoprazole    Tablet 40 milliGRAM(s) Oral before breakfast  polyethylene glycol 3350 17 Gram(s) Oral daily PRN  predniSONE   Tablet 40 milliGRAM(s) Oral daily  senna 2 Tablet(s) Oral at bedtime    acetaminophen     Tablet .. 650 milliGRAM(s) Oral every 6 hours PRN  ALBUTerol    90 MICROgram(s) HFA Inhaler 2 Puff(s) Inhalation every 6 hours  ALBUTerol    90 MICROgram(s) HFA Inhaler 2 Puff(s) Inhalation every 6 hours  benzonatate 100 milliGRAM(s) Oral three times a day  chlorhexidine 2% Cloths 1 Application(s) Topical <User Schedule>  enoxaparin Injectable 60 milliGRAM(s) SubCutaneous every 12 hours  guaiFENesin Oral Liquid (Sugar-Free) 200 milliGRAM(s) Oral every 6 hours PRN  insulin lispro (ADMELOG) corrective regimen sliding scale   SubCutaneous Before meals and at bedtime  pantoprazole    Tablet 40 milliGRAM(s) Oral before breakfast  polyethylene glycol 3350 17 Gram(s) Oral daily PRN  predniSONE   Tablet 40 milliGRAM(s) Oral daily  senna 2 Tablet(s) Oral at bedtime    Drug Dosing Weight  Height (cm): 172.7 (28 Jun 2022 06:32)  Weight (kg): 196.5 (28 Jun 2022 16:25)  BMI (kg/m2): 65.9 (28 Jun 2022 16:25)  BSA (m2): 2.84 (28 Jun 2022 16:25)    CENTRAL LINE: [ ] YES [x ] NO  LOCATION:     HORVATH: [ ] YES [x ] NO      A-LINE:  [ ] YES [x ] NO  LOCATION:         ICU Vital Signs Last 24 Hrs  T(C): 36 (01 Jul 2022 08:00), Max: 36.8 (01 Jul 2022 00:00)  T(F): 96.8 (01 Jul 2022 08:00), Max: 98.2 (01 Jul 2022 00:00)  HR: 91 (01 Jul 2022 11:00) (64 - 108)  BP: 131/74 (01 Jul 2022 11:00) (104/62 - 145/76)  BP(mean): 84 (01 Jul 2022 11:00) (63 - 98)  ABP: --  ABP(mean): --  RR: 22 (01 Jul 2022 11:00) (15 - 28)  SpO2: 91% (01 Jul 2022 11:00) (84% - 97%)      ABG - ( 30 Jun 2022 11:12 )  pH, Arterial: 7.45  pH, Blood: x     /  pCO2: 60    /  pO2: 70    / HCO3: 42    / Base Excess: 14.8  /  SaO2: 95                    06-30 @ 07:01  -  07-01 @ 07:00  --------------------------------------------------------  IN: 360 mL / OUT: 1225 mL / NET: -865 mL            PHYSICAL EXAM:    GENERAL: NAD, well-groomed, well-developed  EYES: EOMI, PERRLA,   NECK: Supple, No JVD; Normal thyroid; Trachea midline  NERVOUS SYSTEM:  Alert & Oriented X3,  Motor Strength 5/5 B/L upper and lower extremities; DTRs 2+ intact and symmetric  CHEST/LUNG: No rales, rhonchi, wheezing   HEART: Regular rate and rhythm; No murmurs,   ABDOMEN: Soft, Nontender, Nondistended; Bowel sounds present  EXTREMITIES:  2+ Peripheral Pulses, No clubbing, cyanosis, or edema      LABS:                        14.0   10.91 )-----------( 239      ( 01 Jul 2022 04:34 )             45.0     07-01    140  |  97  |  17  ----------------------------<  106<H>  4.0   |  37<H>  |  0.76    Ca    8.9      01 Jul 2022 04:34  Phos  4.2     07-01  Mg     2.4     07-01    TPro  7.2  /  Alb  2.6<L>  /  TBili  0.4  /  DBili  x   /  AST  20  /  ALT  34  /  AlkPhos  55  07-01        CAPILLARY BLOOD GLUCOSE      POCT Blood Glucose.: 207 mg/dL (01 Jul 2022 11:28)  POCT Blood Glucose.: 107 mg/dL (01 Jul 2022 08:10)  POCT Blood Glucose.: 132 mg/dL (30 Jun 2022 22:02)  POCT Blood Glucose.: 152 mg/dL (30 Jun 2022 16:59)        RADIOLOGY & ADDITIONAL STUDIES REVIEWED:  ***

## 2022-07-01 NOTE — PROGRESS NOTE ADULT - ASSESSMENT
36M from home w/ morbid obesity and PMH of TAL (awaiting CPAP) and DM, presents with SOB x5 days    Assessment:  # AHRF due to Human metapneumovirus  # DM    Plan:  Neuro:  No Issues    Cardiovascular:  No issues    Pulmonary:   #AHRF due to viral infection  - Solumedrol 40 q12 to Prednisone 40 x5 days 6/29-7/4  - Duonebs  - Albuterol  - Tessalon perles q8, add robitusin prn  - NC during day, BIPAP at night      #TAL  - BIPAP 22/18 as per outpatient sleep study  - 85% at rest, lowest 68% on room air during sleep study  - BIPAP QHS 14/6  - F/u with Dr. Montes  -  consult for BIPAP delivery    Gastrointestinal:  Diabetic diet  # constipation - resolved  - s/p stool softener with bm.    Renal:  No issues    Infectious Diseases:  # Human Metapneumovirus infection  - supportive care  - plan as above    Heme/onc:   No issues    Endo:   # DM  - A1c 8.5%  - Sliding scale  - FS ACHS  - Consider starting Ozempic outpatient for wt loss and diabetes.     Skin/ catheter:   N/A    Prophylaxis:   Lovenox 60bid    Goals of Care:   Full Code    Dispo:  Admit to ICU  36M from home w/ morbid obesity and PMH of TAL (awaiting CPAP) and DM, presents with SOB x5 days    Assessment:  # AHRF due to Human metapneumovirus  # DM    Plan:  Neuro:  No Issues    Cardiovascular:  No issues    Pulmonary:   #AHRF due to viral infection  - Solumedrol 40 q12 switched to Prednisone 40 x5 days 6/29-7/4  - Duonebs  - Albuterol  - Tessalon perles q8, add robitusin prn  - NC during day, BIPAP at night      #TAL  - BIPAP 22/18 as per outpatient sleep study  - 85% at rest, lowest 68% on room air during sleep study  - BIPAP QHS 14/6  - F/u with Dr. Montes  -  consult for BIPAP delivery    Gastrointestinal:  Diabetic diet  # constipation - resolved  - s/p stool softener with bm.    Renal:  No issues    Infectious Diseases:  # Human Metapneumovirus infection  - supportive care  - plan as above    Heme/onc:   No issues    Endo:   # DM  - A1c 8.5%  - Sliding scale  - FS ACHS  - Consider starting Ozempic outpatient for wt loss and diabetes.     Skin/ catheter:   N/A    Prophylaxis:   Lovenox 60bid    Goals of Care:   Full Code    Dispo:  Admit to ICU, pending downgrade

## 2022-07-01 NOTE — PROGRESS NOTE ADULT - SUBJECTIVE AND OBJECTIVE BOX
[facility]    [facility] progress note    INTERVAL HPI/OVERNIGHT EVENTS: ***transferred from ICU to SCU  HPI 36M from home w/ morbid obesity and PMH of TAL (awaiting BIPAP) and DM, presents with SOB x5 days. Pt returned to the ed with cough and sob. Pt with increased sputum production. [facility]. Pt with bouts of coughing and difficulty breathing during this time. Pt with streaks of blood. He endorses nausea. He denies fever, chills, vomiting, chest pain, abdominal pain, urinary or bowel movement changes.     Patient abg after admission showed worsening of hypercapnia, and patient was transferred to ICU for Bipap due to acute hypercapnic respiratory failure 2/2 to Human Metapneumovirus. ABG improved after initiation of bipap. Outpatient records indicate patient fitted for BIPAP for TAL, settings  22/18. Patient tolerating 14/6 while in ICU, continuing this settings for now at night. H. Metapneumovirus being treated with Duoneb, prednisone, Robitussin.       DNR [ ]   DNI  [  ]   FULL CODE    Covid - 19 PCR: Negative 6/28    The 4Ms    What Matters Most: see GOC  Age appropriate Medications/Screen for High Risk Medication: Yes  Mentation: see CAM below  Mobility: defer to physical exam    The Confusion Assessment Method (CAM) Diagnostic Algorithm     1: Acute Onset or Fluctuating Course  - Is there evidence of an acute change in mental status from the patient’s baseline? Did the (abnormal) behavior  fluctuate during the day, that is, tend to come and go, or increase and decrease in severity?  [ ] YES [x ] NO     2: Inattention  - Did the patient have difficulty focusing attention, being easily distractible, or having difficulty keeping track of what was being said?  [ ] YES [x ] NO     3: Disorganized thinking  -Was the patient’s thinking disorganized or incoherent, such as rambling or irrelevant conversation, unclear or illogical flow of ideas, or unpredictable switching from subject to subject?  [ ] YES [x ] NO    4: Altered Level of consciousness?  [ ] YES [x ] NO    The diagnosis of delirium by CAM requires the presence of features 1 and 2 and either 3 or 4.    PRESSORS: [ ] YES [x ] NO    Cardiovascular:  Heart Failure  Acute   Acute on Chronic  Chronic         Pulmonary:  ALBUTerol    90 MICROgram(s) HFA Inhaler 2 Puff(s) Inhalation every 6 hours  ALBUTerol    90 MICROgram(s) HFA Inhaler 2 Puff(s) Inhalation every 6 hours  benzonatate 100 milliGRAM(s) Oral three times a day  guaiFENesin Oral Liquid (Sugar-Free) 200 milliGRAM(s) Oral every 6 hours PRN    Hematalogic:  enoxaparin Injectable 60 milliGRAM(s) SubCutaneous every 12 hours    Other:  acetaminophen     Tablet .. 650 milliGRAM(s) Oral every 6 hours PRN  insulin lispro (ADMELOG) corrective regimen sliding scale   SubCutaneous Before meals and at bedtime  pantoprazole    Tablet 40 milliGRAM(s) Oral before breakfast  polyethylene glycol 3350 17 Gram(s) Oral daily PRN  predniSONE   Tablet 40 milliGRAM(s) Oral daily  senna 2 Tablet(s) Oral at bedtime    acetaminophen     Tablet .. 650 milliGRAM(s) Oral every 6 hours PRN  ALBUTerol    90 MICROgram(s) HFA Inhaler 2 Puff(s) Inhalation every 6 hours  ALBUTerol    90 MICROgram(s) HFA Inhaler 2 Puff(s) Inhalation every 6 hours  benzonatate 100 milliGRAM(s) Oral three times a day  enoxaparin Injectable 60 milliGRAM(s) SubCutaneous every 12 hours  guaiFENesin Oral Liquid (Sugar-Free) 200 milliGRAM(s) Oral every 6 hours PRN  insulin lispro (ADMELOG) corrective regimen sliding scale   SubCutaneous Before meals and at bedtime  pantoprazole    Tablet 40 milliGRAM(s) Oral before breakfast  polyethylene glycol 3350 17 Gram(s) Oral daily PRN  predniSONE   Tablet 40 milliGRAM(s) Oral daily  senna 2 Tablet(s) Oral at bedtime    Drug Dosing Weight  Height (cm): 172.7 (28 Jun 2022 06:32)  Weight (kg): 196.5 (28 Jun 2022 16:25)  BMI (kg/m2): 65.9 (28 Jun 2022 16:25)  BSA (m2): 2.84 (28 Jun 2022 16:25)    CENTRAL LINE: [ ] YES [x ] NO  LOCATION:   DATE INSERTED:  REMOVE: [ ] YES [ ] NO  EXPLAIN:    HORVATH: [ ] YES [x ] NO    DATE INSERTED:  REMOVE:  [ ] YES [ ] NO  EXPLAIN:    PAST MEDICAL & SURGICAL HISTORY:  Morbid obesity      Asthma      Pneumonia  , in 2019      2019 novel coronavirus disease (COVID-19)  , 2019      DM (diabetes mellitus)      TAL (obstructive sleep apnea)      No significant past surgical history            ABG - ( 30 Jun 2022 11:12 )  pH, Arterial: 7.45  pH, Blood: x     /  pCO2: 60    /  pO2: 70    / HCO3: 42    / Base Excess: 14.8  /  SaO2: 95                    06-30 @ 07:01  -  07-01 @ 07:00  --------------------------------------------------------  IN: 360 mL / OUT: 1225 mL / NET: -865 mL            PHYSICAL EXAM:    GENERAL: NAD, morbidly obese  HEAD:  Atraumatic, Normocephalic  EYES: EOMI, PERRLA, conjunctiva and sclera clear  ENMT: No tonsillar erythema, exudates  NECK: Supple, No JVD  NERVOUS SYSTEM:  Alert & Oriented X3,  Follows commands, moving all extremities.  CHEST/LUNG: Diminished breath sounds with a few scattered rhonchi  HEART: Regular rate and rhythm; No murmurs, rubs, or gallops  ABDOMEN: Soft, Obese, Nontender, Nondistended; Bowel sounds present  EXTREMITIES:  2+ Peripheral Pulses, No clubbing, cyanosis, or edema  LYMPH: No lymphadenopathy noted  SKIN: No rashes or lesions      LABS:  CBC Full  -  ( 01 Jul 2022 04:34 )  WBC Count : 10.91 K/uL  RBC Count : 4.96 M/uL  Hemoglobin : 14.0 g/dL  Hematocrit : 45.0 %  Platelet Count - Automated : 239 K/uL  Mean Cell Volume : 90.7 fl  Mean Cell Hemoglobin : 28.2 pg  Mean Cell Hemoglobin Concentration : 31.1 gm/dL  Auto Neutrophil # : 6.88 K/uL  Auto Lymphocyte # : 3.11 K/uL  Auto Monocyte # : 0.74 K/uL  Auto Eosinophil # : 0.11 K/uL  Auto Basophil # : 0.03 K/uL  Auto Neutrophil % : 63.0 %  Auto Lymphocyte % : 28.5 %  Auto Monocyte % : 6.8 %  Auto Eosinophil % : 1.0 %  Auto Basophil % : 0.3 %    07-01    140  |  97  |  17  ----------------------------<  106<H>  4.0   |  37<H>  |  0.76    Ca    8.9      01 Jul 2022 04:34  Phos  4.2     07-01  Mg     2.4     07-01    TPro  7.2  /  Alb  2.6<L>  /  TBili  0.4  /  DBili  x   /  AST  20  /  ALT  34  /  AlkPhos  55  07-01              [  ]  DVT Prophylaxis  [  ]  Nutrition, Brand, Rate         Goal Rate        Abnormal Nutritional Status -  Malnutrition        Morbid Obesity BMI >/=40   BMI 66    RADIOLOGY & ADDITIONAL STUDIES:  ***  < from: Xray Chest 1 View- PORTABLE-Urgent (06.28.22 @ 09:38) >  FINDINGS: There is suboptimal inspiratory result which causes crowding of   the vascular structures and limits evaluation of the lung parenchyma.    Questionable vague patchy opacities are seen throughout both lungs which   were grossly unchanged. The heart size is at the upper limits of normal.   The osseous structures appear unremarkable.    IMPRESSION: Questionable vague patchy opacities throughout both lungs for   which multifocal pneumonia cannot be excluded.      < end of copied text >    Goals of Care Discussion with Family/Proxy/Other   - see note from/family meeting set up for...

## 2022-07-01 NOTE — PROGRESS NOTE ADULT - ASSESSMENT
36M from home w/ morbid obesity and PMH of TAL (awaiting CPAP) and DM, presents with SOB x5 days. Pt returned to the ed with cough and sob. Pt with increased sputum production. Pt was at [facility] 6/26 and left AMA. Pt with bouts of coughing and difficulty breathing during this time. Pt with streaks of blood. He endorses nausea. He denies fever, chills, vomiting, chest pain, abdominal pain, urinary or bowel movement changes.     Patient abg after admission showed worsening of hypercapnia, and patient was transferred to ICU for Bipap due to acute hypercapnic respiratory failure 2/2 to Human Metapneumovirus. ABG improved after initiation of bipap. Outpatient records indicate patient fitted for BIPAP for TAL, settings  22/18. Patient tolerating 14/6 while in ICU, continuing this settings for now at night. H. Metapneumovirus being treated with Duoneb, prednisone, Robitussin.

## 2022-07-01 NOTE — PROGRESS NOTE ADULT - PROBLEM SELECTOR PLAN 3
Continue oxygen support during the day and BIPAP nightly.  Continue bronchodilators.  Prednisone for total 5 days.

## 2022-07-01 NOTE — CHART NOTE - NSCHARTNOTEFT_GEN_A_CORE
36M from home w/ morbid obesity and PMH of TAL (awaiting CPAP) and DM, presents with SOB x5 days. Pt returned to the ed with cough and sob. Pt with increased sputum production. Pt was at [facility] 6/26 and left AMA. Pt with bouts of coughing and difficulty breathing during this time. Pt with streaks of blood. He endorses nausea. He denies fever, chills, vomiting, chest pain, abdominal pain, urinary or bowel movement changes.     Patient abg after admission showed worsening of hypercapnia, and patient was transferred to ICU for Bipap due to acute hypercapnic respiratory failure 2/2 to Human Metapneumovirus. ABG improved after initiation of bipap. Outpatient records indicate patient fitted for BIPAP for TAL, settings  22/18. Patient tolerating 14/6 while in ICU, continuing this settings for now at night. H. Metapneumovirus being treated with Duoneb, prednisone, Robitussin.     Patient stable at this time. Pt signed out to Attending  xxxxx, and NP, patient up for transfer to room 404A    F/u:  Continue BIPAP at night time  f/u with Dr. Montes for TAL and BIPAP machine delivery   consult for BIPAP machine delivery. 36M from home w/ morbid obesity and PMH of TAL (awaiting CPAP) and DM, presents with SOB x5 days. Pt returned to the ed with cough and sob. Pt with increased sputum production. Pt was at [facility] 6/26 and left AMA. Pt with bouts of coughing and difficulty breathing during this time. Pt with streaks of blood. He endorses nausea. He denies fever, chills, vomiting, chest pain, abdominal pain, urinary or bowel movement changes.     Patient abg after admission showed worsening of hypercapnia, and patient was transferred to ICU for Bipap due to acute hypercapnic respiratory failure 2/2 to Human Metapneumovirus. ABG improved after initiation of bipap. Outpatient records indicate patient fitted for BIPAP for TAL, settings  22/18. Patient tolerating 14/6 while in ICU, continuing this settings for now at night. H. Metapneumovirus being treated with Duoneb, prednisone, Robitussin.     Patient stable at this time. Pt signed out to Attending Dr. Russo and NP Pat, patient up for transfer to room 404A    F/u:  Continue BIPAP at night time  f/u with Dr. Montes for TAL and BIPAP machine delivery   consult for BIPAP machine delivery.

## 2022-07-02 LAB
GLUCOSE BLDC GLUCOMTR-MCNC: 116 MG/DL — HIGH (ref 70–99)
GLUCOSE BLDC GLUCOMTR-MCNC: 130 MG/DL — HIGH (ref 70–99)
GLUCOSE BLDC GLUCOMTR-MCNC: 131 MG/DL — HIGH (ref 70–99)
GLUCOSE BLDC GLUCOMTR-MCNC: 149 MG/DL — HIGH (ref 70–99)

## 2022-07-02 PROCEDURE — 99233 SBSQ HOSP IP/OBS HIGH 50: CPT

## 2022-07-02 PROCEDURE — 99222 1ST HOSP IP/OBS MODERATE 55: CPT

## 2022-07-02 RX ADMIN — ALBUTEROL 2 PUFF(S): 90 AEROSOL, METERED ORAL at 22:16

## 2022-07-02 RX ADMIN — Medication 100 MILLIGRAM(S): at 22:13

## 2022-07-02 RX ADMIN — Medication 40 MILLIGRAM(S): at 05:08

## 2022-07-02 RX ADMIN — ENOXAPARIN SODIUM 60 MILLIGRAM(S): 100 INJECTION SUBCUTANEOUS at 05:08

## 2022-07-02 RX ADMIN — Medication 100 MILLIGRAM(S): at 05:08

## 2022-07-02 RX ADMIN — PANTOPRAZOLE SODIUM 40 MILLIGRAM(S): 20 TABLET, DELAYED RELEASE ORAL at 05:08

## 2022-07-02 RX ADMIN — Medication 100 MILLIGRAM(S): at 14:54

## 2022-07-02 RX ADMIN — ALBUTEROL 2 PUFF(S): 90 AEROSOL, METERED ORAL at 16:31

## 2022-07-02 RX ADMIN — ENOXAPARIN SODIUM 60 MILLIGRAM(S): 100 INJECTION SUBCUTANEOUS at 17:20

## 2022-07-02 RX ADMIN — ALBUTEROL 2 PUFF(S): 90 AEROSOL, METERED ORAL at 11:10

## 2022-07-02 NOTE — PROGRESS NOTE ADULT - ASSESSMENT
36M from home w/ morbid obesity and PMH of TAL (awaiting CPAP) and DM, presents with SOB x5 days. Pt returned to the ed with cough and sob. Pt with increased sputum production. Pt was at [facility] 6/26 and left AMA. Pt with bouts of coughing and difficulty breathing during this time. Pt with streaks of blood. He endorses nausea. He denies fever, chills, vomiting, chest pain, abdominal pain, urinary or bowel movement changes.     Patient abg after admission showed worsening of hypercapnia, and patient was transferred to ICU for Bipap due to acute hypercapnic respiratory failure 2/2 to Human Metapneumovirus. ABG improved after initiation of bipap. Outpatient records indicate patient fitted for BIPAP for TAL, settings  22/18. Patient tolerating 14/6 while in ICU, continuing this settings for now at night. H. Metapneumovirus being treated with Duoneb, prednisone, Robitussin.

## 2022-07-02 NOTE — CONSULT NOTE ADULT - SUBJECTIVE AND OBJECTIVE BOX
History of Present Illness:  Reason for Admission: AHRF secondary to metapneumovirus infection  History of Present Illness:    36M from home w/ morbid obesity and PMH of TAL (awaiting CPAP) and DM, presents with SOB x5 days. Pt returned to the ed with cough and sob. Pt with increased sputum production. Pt was at [facility] 6/26 and left AMA. Pt with bouts of coughing and difficulty breathing during this time. Pt with streaks of blood. He endorses nausea. He denies fever, chills, vomiting, chest pain, abdominal pain, urinary or bowel movement changes.      Review of Systems:  Review of Systems: CONSTITUTIONAL: No fever, weight loss, (+)fatigue  RESPIRATORY: No chills, (+) cough, (+) hemoptysis; (+) shortness of breath  CARDIOVASCULAR: No chest pain, palpitations, dizziness, or leg swelling  GASTROINTESTINAL: No abdominal pain. No nausea, vomiting, or hematemesis; No diarrhea or constipation. No melena or hematochezia.  GENITOURINARY: No dysuria or hematuria, urinary frequency  NEUROLOGICAL: No headaches, memory loss, loss of strength, numbness, or tremors  ENDOCRINE: No polyuria, polydipsia, or heat/cold intolerance  MUSKULOSKELETAL: No muscle aches, joint pains  HEME: no easy bruisability, no tender or enlarged lymph nodes  SKIN: No itching, burning, rashes, or lesions .      Allergies and Intolerances:        Allergies:  	No Known Allergies:     Home Medications:   * No Current Medications as of 26-Jun-2022 21:59 documented in Structured Notes    .    Patient History:    Past Medical, Past Surgical, and Family History:  PAST MEDICAL HISTORY:  2019 novel coronavirus disease (COVID-19) , 2019    Asthma     DM (diabetes mellitus)     Morbid obesity     TAL (obstructive sleep apnea)     Pneumonia , in 2019.     PAST SURGICAL HISTORY:  No significant past surgical history.     FAMILY HISTORY:  Grandparent  Still living? No  FH: prostate cancer, Age at diagnosis: Age Unknown.     Social History:  Social History (marital status, living situation, occupation, tobacco use, alcohol and drug use, and sexual history): Denies smoking, alcohol intake, and illicit drug use        Physical Exam:   Physical Exam: General - NAD, sitting up in bed, well groomed  Eyes - PERRLA, EOM intact  ENT - Nonicteric sclerae, PERRLA, EOMI. Oropharynx clear. Moist mucous membranes. Conjunctivae appear well perfused.   Neck - No noticeable or palpable swelling, redness or rash around throat or on face  Lymph Nodes - No lymphadenopathy  Cardiovascular - RRR no m/r/g, no JVD, no carotid bruits  Lungs - (+) expiratory wheezing  Skin - No rashes, skin warm and dry, no erythematous areas  Abdomen - Normal bowel sounds, abdomen soft and nontender  Rectal – Rectal exam not performed since no symptoms indicated blood loss.  Extremities - No edema, cyanosis or clubbing  Musculoskeletal - 5/5 strength, normal range of motion, no swollen or erythematous joints.  Neuro– Alert and oriented x 3, CN 2-12 grossly intact.     History of Present Illness:  Reason for Admission: AHRF secondary to metapneumovirus infection  History of Present Illness:    36M from home w/ morbid obesity and PMH of TAL (awaiting CPAP) and DM, presents with SOB x5 days. Pt returned to the ed with cough and sob. Pt with increased sputum production. Pt was at [facility] 6/26 and left AMA. Pt with bouts of coughing and difficulty breathing during this time. Pt with streaks of blood. He endorses nausea. He denies fever, chills, vomiting, chest pain, abdominal pain, urinary or bowel movement changes.      Review of Systems:  Review of Systems: CONSTITUTIONAL: No fever, weight loss, (+)fatigue  RESPIRATORY: No chills, (+) cough, (+) hemoptysis; (+) shortness of breath  CARDIOVASCULAR: No chest pain, palpitations, dizziness, or leg swelling  GASTROINTESTINAL: No abdominal pain. No nausea, vomiting, or hematemesis; No diarrhea or constipation. No melena or hematochezia.  GENITOURINARY: No dysuria or hematuria, urinary frequency  NEUROLOGICAL: No headaches, memory loss, loss of strength, numbness, or tremors  ENDOCRINE: No polyuria, polydipsia, or heat/cold intolerance  MUSKULOSKELETAL: No muscle aches, joint pains  HEME: no easy bruisability, no tender or enlarged lymph nodes  SKIN: No itching, burning, rashes, or lesions .      Allergies and Intolerances:        Allergies:  	No Known Allergies:     Home Medications:   * No Current Medications as of 26-Jun-2022 21:59 documented in Structured Notes    .    Patient History:    Past Medical, Past Surgical, and Family History:  PAST MEDICAL HISTORY:  2019 novel coronavirus disease (COVID-19) , 2019    Asthma     DM (diabetes mellitus)     Morbid obesity     TAL (obstructive sleep apnea)     Pneumonia , in 2019.     PAST SURGICAL HISTORY:  No significant past surgical history.     FAMILY HISTORY:  Grandparent  Still living? No  FH: prostate cancer, Age at diagnosis: Age Unknown.     Social History:  Social History (marital status, living situation, occupation, tobacco use, alcohol and drug use, and sexual history): Denies smoking, alcohol intake, and illicit drug use        Physical Exam:   Physical Exam: General - NAD, sitting up in bed, well groomed  Eyes - PERRLA, EOM intact  ENT - Nonicteric sclerae, PERRLA, EOMI. Oropharynx clear. Moist mucous membranes. Conjunctivae appear well perfused.   Neck - No noticeable or palpable swelling, redness or rash around throat or on face  Lymph Nodes - No lymphadenopathy  Cardiovascular - RRR no m/r/g, no JVD, no carotid bruits  Lungs - CTA  Skin - No rashes, skin warm and dry, no erythematous areas  Abdomen - Normal bowel sounds, abdomen soft and nontender  Rectal – Rectal exam not performed since no symptoms indicated blood loss.  Extremities - No edema, cyanosis or clubbing  Musculoskeletal - 5/5 strength, normal range of motion, no swollen or erythematous joints.  Neuro– Alert and oriented x 3, CN 2-12 grossly intact.

## 2022-07-02 NOTE — CONSULT NOTE ADULT - ASSESSMENT
Impression:  Morbid Obesity  OHA/TAL  Acute on chronic hypercapnea  Acute hypoxia due to viral pneumonitis HMNV    Recommend:  Nightly BIPAP  Pt to have home BIPAP delivered once discharged  Taper down O2 as tolerated  Incentive Spirometry  Continue 40mg Prednisone  Albuterol  DVT prophy

## 2022-07-02 NOTE — PROGRESS NOTE ADULT - SUBJECTIVE AND OBJECTIVE BOX
[facility] Medicine    Patient is a 36y old  Male who presents with a chief complaint of AHRF secondary to metapneumovirus infection (01 Jul 2022 16:17)      SUBJECTIVE / OVERNIGHT EVENTS: No acute events overnight. Patient wore his BIPAP. Reports he was able to shower off O2 with minimal SOB and cough.     MEDICATIONS  (STANDING):  ALBUTerol    90 MICROgram(s) HFA Inhaler 2 Puff(s) Inhalation every 6 hours  ALBUTerol    90 MICROgram(s) HFA Inhaler 2 Puff(s) Inhalation every 6 hours  benzonatate 100 milliGRAM(s) Oral three times a day  enoxaparin Injectable 60 milliGRAM(s) SubCutaneous every 12 hours  insulin lispro (ADMELOG) corrective regimen sliding scale   SubCutaneous Before meals and at bedtime  pantoprazole    Tablet 40 milliGRAM(s) Oral before breakfast  predniSONE   Tablet 40 milliGRAM(s) Oral daily  senna 2 Tablet(s) Oral at bedtime    MEDICATIONS  (PRN):  acetaminophen     Tablet .. 650 milliGRAM(s) Oral every 6 hours PRN Temp greater or equal to 38C (100.4F), Moderate Pain (4 - 6)  guaiFENesin Oral Liquid (Sugar-Free) 200 milliGRAM(s) Oral every 6 hours PRN Cough  polyethylene glycol 3350 17 Gram(s) Oral daily PRN Constipation      Vital Signs Last 24 Hrs  T(C): 36.3 (07-02-22 @ 13:00)  T(F): 97.3 (07-02-22 @ 13:00), Max: 98.3 (07-01-22 @ 20:47)  HR: 92 (07-02-22 @ 13:00) (82 - 92)  BP: 120/77 (07-02-22 @ 13:00)  BP(mean): --  RR: 20 (07-02-22 @ 13:00) (18 - 20)  SpO2: 93% (07-02-22 @ 13:00) (93% - 94%)  Wt(kg): --    07-01 @ 07:01  -  07-02 @ 07:00  --------------------------------------------------------  IN: 0 mL / OUT: 450 mL / NET: -450 mL      CAPILLARY BLOOD GLUCOSE      POCT Blood Glucose.: 131 mg/dL (02 Jul 2022 16:54)  POCT Blood Glucose.: 149 mg/dL (02 Jul 2022 11:50)  POCT Blood Glucose.: 116 mg/dL (02 Jul 2022 07:45)  POCT Blood Glucose.: 101 mg/dL (01 Jul 2022 21:24)    I&O's Summary    01 Jul 2022 07:01  -  02 Jul 2022 07:00  --------------------------------------------------------  IN: 0 mL / OUT: 450 mL / NET: -450 mL        PHYSICAL EXAM:  GENERAL: NAD, well-developed, obese  HEAD:  Atraumatic, Normocephalic  EYES:  conjunctiva and sclera clear  NECK: Enlarged neck circumference  CHEST/LUNG: Decreased BS 2/2 habitus; No wheeze, rhonchi, rales  HEART: Regular rate and rhythm; No murmurs, rubs, or gallops  ABDOMEN: Soft, Nontender, Nondistended; Bowel sounds present  EXTREMITIES:  2+ Peripheral Pulses, No clubbing, cyanosis, or edema  PSYCH: AAOx3, pleasant, cooperative  NEUROLOGY: non-focal  SKIN: No rashes or lesions    LABS:                        14.0   10.91 )-----------( 239      ( 01 Jul 2022 04:34 )             45.0     07-01    140  |  97  |  17  ----------------------------<  106<H>  4.0   |  37<H>  |  0.76    Ca    8.9      01 Jul 2022 04:34  Phos  4.2     07-01  Mg     2.4     07-01    TPro  7.2  /  Alb  2.6<L>  /  TBili  0.4  /  DBili  x   /  AST  20  /  ALT  34  /  AlkPhos  55  07-01              RADIOLOGY & ADDITIONAL TESTS:    Imaging Personally Reviewed:    Consultant(s) Notes Reviewed:      Care Discussed with Consultants/Other Providers:    Assessment and Plan:

## 2022-07-03 LAB
ANION GAP SERPL CALC-SCNC: 6 MMOL/L — SIGNIFICANT CHANGE UP (ref 5–17)
BASOPHILS # BLD AUTO: 0.02 K/UL — SIGNIFICANT CHANGE UP (ref 0–0.2)
BASOPHILS NFR BLD AUTO: 0.2 % — SIGNIFICANT CHANGE UP (ref 0–2)
BUN SERPL-MCNC: 15 MG/DL — SIGNIFICANT CHANGE UP (ref 7–18)
CALCIUM SERPL-MCNC: 9.4 MG/DL — SIGNIFICANT CHANGE UP (ref 8.4–10.5)
CHLORIDE SERPL-SCNC: 99 MMOL/L — SIGNIFICANT CHANGE UP (ref 96–108)
CO2 SERPL-SCNC: 33 MMOL/L — HIGH (ref 22–31)
CREAT SERPL-MCNC: 0.73 MG/DL — SIGNIFICANT CHANGE UP (ref 0.5–1.3)
EGFR: 122 ML/MIN/1.73M2 — SIGNIFICANT CHANGE UP
EOSINOPHIL # BLD AUTO: 0.05 K/UL — SIGNIFICANT CHANGE UP (ref 0–0.5)
EOSINOPHIL NFR BLD AUTO: 0.5 % — SIGNIFICANT CHANGE UP (ref 0–6)
GLUCOSE BLDC GLUCOMTR-MCNC: 103 MG/DL — HIGH (ref 70–99)
GLUCOSE BLDC GLUCOMTR-MCNC: 110 MG/DL — HIGH (ref 70–99)
GLUCOSE BLDC GLUCOMTR-MCNC: 128 MG/DL — HIGH (ref 70–99)
GLUCOSE BLDC GLUCOMTR-MCNC: 146 MG/DL — HIGH (ref 70–99)
GLUCOSE SERPL-MCNC: 168 MG/DL — HIGH (ref 70–99)
HCT VFR BLD CALC: 45.5 % — SIGNIFICANT CHANGE UP (ref 39–50)
HGB BLD-MCNC: 14.2 G/DL — SIGNIFICANT CHANGE UP (ref 13–17)
IMM GRANULOCYTES NFR BLD AUTO: 0.5 % — SIGNIFICANT CHANGE UP (ref 0–1.5)
LYMPHOCYTES # BLD AUTO: 0.79 K/UL — LOW (ref 1–3.3)
LYMPHOCYTES # BLD AUTO: 7.3 % — LOW (ref 13–44)
MCHC RBC-ENTMCNC: 28 PG — SIGNIFICANT CHANGE UP (ref 27–34)
MCHC RBC-ENTMCNC: 31.2 GM/DL — LOW (ref 32–36)
MCV RBC AUTO: 89.6 FL — SIGNIFICANT CHANGE UP (ref 80–100)
MONOCYTES # BLD AUTO: 0.34 K/UL — SIGNIFICANT CHANGE UP (ref 0–0.9)
MONOCYTES NFR BLD AUTO: 3.1 % — SIGNIFICANT CHANGE UP (ref 2–14)
NEUTROPHILS # BLD AUTO: 9.64 K/UL — HIGH (ref 1.8–7.4)
NEUTROPHILS NFR BLD AUTO: 88.4 % — HIGH (ref 43–77)
NRBC # BLD: 0 /100 WBCS — SIGNIFICANT CHANGE UP (ref 0–0)
PLATELET # BLD AUTO: 239 K/UL — SIGNIFICANT CHANGE UP (ref 150–400)
POTASSIUM SERPL-MCNC: 4.2 MMOL/L — SIGNIFICANT CHANGE UP (ref 3.5–5.3)
POTASSIUM SERPL-SCNC: 4.2 MMOL/L — SIGNIFICANT CHANGE UP (ref 3.5–5.3)
RBC # BLD: 5.08 M/UL — SIGNIFICANT CHANGE UP (ref 4.2–5.8)
RBC # FLD: 15.2 % — HIGH (ref 10.3–14.5)
SODIUM SERPL-SCNC: 138 MMOL/L — SIGNIFICANT CHANGE UP (ref 135–145)
WBC # BLD: 10.89 K/UL — HIGH (ref 3.8–10.5)
WBC # FLD AUTO: 10.89 K/UL — HIGH (ref 3.8–10.5)

## 2022-07-03 PROCEDURE — 99233 SBSQ HOSP IP/OBS HIGH 50: CPT

## 2022-07-03 PROCEDURE — 99232 SBSQ HOSP IP/OBS MODERATE 35: CPT

## 2022-07-03 RX ADMIN — ALBUTEROL 2 PUFF(S): 90 AEROSOL, METERED ORAL at 15:35

## 2022-07-03 RX ADMIN — PANTOPRAZOLE SODIUM 40 MILLIGRAM(S): 20 TABLET, DELAYED RELEASE ORAL at 06:12

## 2022-07-03 RX ADMIN — ENOXAPARIN SODIUM 60 MILLIGRAM(S): 100 INJECTION SUBCUTANEOUS at 17:37

## 2022-07-03 RX ADMIN — ALBUTEROL 2 PUFF(S): 90 AEROSOL, METERED ORAL at 10:18

## 2022-07-03 RX ADMIN — Medication 100 MILLIGRAM(S): at 06:12

## 2022-07-03 RX ADMIN — Medication 100 MILLIGRAM(S): at 14:10

## 2022-07-03 RX ADMIN — Medication 40 MILLIGRAM(S): at 06:12

## 2022-07-03 RX ADMIN — Medication 100 MILLIGRAM(S): at 22:17

## 2022-07-03 RX ADMIN — ALBUTEROL 2 PUFF(S): 90 AEROSOL, METERED ORAL at 22:17

## 2022-07-03 RX ADMIN — ALBUTEROL 2 PUFF(S): 90 AEROSOL, METERED ORAL at 03:14

## 2022-07-03 RX ADMIN — ENOXAPARIN SODIUM 60 MILLIGRAM(S): 100 INJECTION SUBCUTANEOUS at 06:12

## 2022-07-03 NOTE — PROGRESS NOTE ADULT - SUBJECTIVE AND OBJECTIVE BOX
[facility] Medicine    Patient is a 36y old  Male who presents with a chief complaint of AHRF secondary to metapneumovirus infection (02 Jul 2022 20:04)      SUBJECTIVE / OVERNIGHT EVENTS: No acute events overnight. Patient wore his BIPAP machine without difficulty. Reports SOB has improved but he continues to have intermittent coughing. No CP, palpitations, dizziness reported. Amenable to weaning oxygen.     MEDICATIONS  (STANDING):  ALBUTerol    90 MICROgram(s) HFA Inhaler 2 Puff(s) Inhalation every 6 hours  ALBUTerol    90 MICROgram(s) HFA Inhaler 2 Puff(s) Inhalation every 6 hours  benzonatate 100 milliGRAM(s) Oral three times a day  enoxaparin Injectable 60 milliGRAM(s) SubCutaneous every 12 hours  insulin lispro (ADMELOG) corrective regimen sliding scale   SubCutaneous Before meals and at bedtime  pantoprazole    Tablet 40 milliGRAM(s) Oral before breakfast  predniSONE   Tablet 40 milliGRAM(s) Oral daily  senna 2 Tablet(s) Oral at bedtime    MEDICATIONS  (PRN):  acetaminophen     Tablet .. 650 milliGRAM(s) Oral every 6 hours PRN Temp greater or equal to 38C (100.4F), Moderate Pain (4 - 6)  guaiFENesin Oral Liquid (Sugar-Free) 200 milliGRAM(s) Oral every 6 hours PRN Cough  polyethylene glycol 3350 17 Gram(s) Oral daily PRN Constipation      Vital Signs Last 24 Hrs  T(C): 37.1 (07-03-22 @ 14:22)  T(F): 98.8 (07-03-22 @ 14:22), Max: 98.8 (07-03-22 @ 14:22)  HR: 70 (07-03-22 @ 14:22) (67 - 71)  BP: 102/74 (07-03-22 @ 14:22)  BP(mean): --  RR: 20 (07-03-22 @ 14:22) (19 - 20)  SpO2: 93% (07-03-22 @ 14:22) (90% - 96%)  Wt(kg): --    CAPILLARY BLOOD GLUCOSE      POCT Blood Glucose.: 146 mg/dL (03 Jul 2022 11:58)  POCT Blood Glucose.: 128 mg/dL (03 Jul 2022 07:54)  POCT Blood Glucose.: 130 mg/dL (02 Jul 2022 21:57)  POCT Blood Glucose.: 131 mg/dL (02 Jul 2022 16:54)    I&O's Summary      PHYSICAL EXAM:  GENERAL: NAD, well-developed  HEAD:  Atraumatic, Normocephalic  EYES:  conjunctiva and sclera clear  NECK: Enlarged neck circumference  CHEST/LUNG: Decreased BS 2/2 habitus; No wheeze, rhonchi, rales  HEART: Regular rate and rhythm; No murmurs, rubs, or gallops  ABDOMEN: Soft, Nontender, Nondistended; Bowel sounds present  EXTREMITIES:  2+ Peripheral Pulses, No clubbing, cyanosis, trace LE edema  PSYCH: AAOx3, pleasant, cooperative  NEUROLOGY: non-focal  SKIN: No rashes or lesions    LABS:                        14.2   10.89 )-----------( 239      ( 03 Jul 2022 11:39 )             45.5     07-03    138  |  99  |  15  ----------------------------<  168<H>  4.2   |  33<H>  |  0.73    Ca    9.4      03 Jul 2022 11:39          RADIOLOGY & ADDITIONAL TESTS:    Imaging Personally Reviewed:    Consultant(s) Notes Reviewed:  Pulmonary    Care Discussed with Consultants/Other Providers: Pulm (Dr. Montes) re: discharge planning for BIPAP    Assessment and Plan:

## 2022-07-03 NOTE — PROGRESS NOTE ADULT - ASSESSMENT
36M from home w/ morbid obesity and PMH of TAL (awaiting CPAP) and DM, presents with SOB x5 days. Pt returned to the ed with cough and sob. Pt with increased sputum production. [facility]. Pt with bouts of coughing and difficulty breathing during this time. Pt with streaks of blood. He endorses nausea. He denies fever, chills, vomiting, chest pain, abdominal pain, urinary or bowel movement changes.     Patient abg after admission showed worsening of hypercapnia, and patient was transferred to ICU for Bipap due to acute hypercapnic respiratory failure 2/2 to Human Metapneumovirus. ABG improved after initiation of bipap. Outpatient records indicate patient fitted for BIPAP for TAL, settings  22/18. Patient tolerating 14/6 while in ICU, continuing this settings for now at night. H. Metapneumovirus being treated with Duoneb, prednisone, Robitussin.

## 2022-07-03 NOTE — PROGRESS NOTE ADULT - PROBLEM SELECTOR PLAN 3
Continue oxygen support during the day and BIPAP nightly.  Continue bronchodilators.  Prednisone for total 5 days through July 4, 2022

## 2022-07-03 NOTE — PROGRESS NOTE ADULT - SUBJECTIVE AND OBJECTIVE BOX
Sitting up in a chair, feeling better. Less cough, no wheezing.     Review of Systems:  Review of Systems: CONSTITUTIONAL: No fever, weight loss, (+)fatigue  RESPIRATORY: No chills, (+) cough, (+) hemoptysis; (+) shortness of breath  CARDIOVASCULAR: No chest pain, palpitations, dizziness, or leg swelling  GASTROINTESTINAL: No abdominal pain. No nausea, vomiting, or hematemesis; No diarrhea or constipation. No melena or hematochezia.  GENITOURINARY: No dysuria or hematuria, urinary frequency  NEUROLOGICAL: No headaches, memory loss, loss of strength, numbness, or tremors  ENDOCRINE: No polyuria, polydipsia, or heat/cold intolerance  MUSKULOSKELETAL: No muscle aches, joint pains  HEME: no easy bruisability, no tender or enlarged lymph nodes  SKIN: No itching, burning, rashes, or lesions .      MEDICATIONS  (STANDING):  ALBUTerol    90 MICROgram(s) HFA Inhaler 2 Puff(s) Inhalation every 6 hours  ALBUTerol    90 MICROgram(s) HFA Inhaler 2 Puff(s) Inhalation every 6 hours  benzonatate 100 milliGRAM(s) Oral three times a day  enoxaparin Injectable 60 milliGRAM(s) SubCutaneous every 12 hours  insulin lispro (ADMELOG) corrective regimen sliding scale   SubCutaneous Before meals and at bedtime  pantoprazole    Tablet 40 milliGRAM(s) Oral before breakfast  predniSONE   Tablet 40 milliGRAM(s) Oral daily  senna 2 Tablet(s) Oral at bedtime    MEDICATIONS  (PRN):  acetaminophen     Tablet .. 650 milliGRAM(s) Oral every 6 hours PRN Temp greater or equal to 38C (100.4F), Moderate Pain (4 - 6)  guaiFENesin Oral Liquid (Sugar-Free) 200 milliGRAM(s) Oral every 6 hours PRN Cough  polyethylene glycol 3350 17 Gram(s) Oral daily PRN Constipation    .Vital Signs Last 24 Hrs  T(C): 37.1 (03 Jul 2022 14:22), Max: 37.1 (03 Jul 2022 14:22)  T(F): 98.8 (03 Jul 2022 14:22), Max: 98.8 (03 Jul 2022 14:22)  HR: 70 (03 Jul 2022 14:22) (67 - 71)  BP: 102/74 (03 Jul 2022 14:22) (102/74 - 119/46)  BP(mean): --  RR: 20 (03 Jul 2022 14:22) (19 - 20)  SpO2: 93% (03 Jul 2022 14:22) (90% - 96%)    Physical Exam:   Physical Exam: General - NAD, sitting up in bed, well groomed  Eyes - PERRLA, EOM intact  ENT - Nonicteric sclerae, PERRLA, EOMI. Oropharynx clear. Moist mucous membranes. Conjunctivae appear well perfused.   Neck - No noticeable or palpable swelling, redness or rash around throat or on face  Lymph Nodes - No lymphadenopathy  Cardiovascular - RRR no m/r/g, no JVD, no carotid bruits  Lungs - CTA  Skin - No rashes, skin warm and dry, no erythematous areas  Abdomen - Normal bowel sounds, abdomen soft and nontender  Rectal – Rectal exam not performed since no symptoms indicated blood loss.  Extremities - No edema, cyanosis or clubbing  Musculoskeletal - 5/5 strength, normal range of motion, no swollen or erythematous joints.  Neuro– Alert and oriented x 3, CN 2-12 grossly intact.                          14.2   10.89 )-----------( 239      ( 03 Jul 2022 11:39 )             45.5   07-03    138  |  99  |  15  ----------------------------<  168<H>  4.2   |  33<H>  |  0.73    Ca    9.4      03 Jul 2022 11:39

## 2022-07-04 ENCOUNTER — TRANSCRIPTION ENCOUNTER (OUTPATIENT)
Age: 37
End: 2022-07-04

## 2022-07-04 LAB
ALBUMIN SERPL ELPH-MCNC: 2.5 G/DL — LOW (ref 3.5–5)
ALP SERPL-CCNC: 58 U/L — SIGNIFICANT CHANGE UP (ref 40–120)
ALT FLD-CCNC: 66 U/L DA — HIGH (ref 10–60)
ANION GAP SERPL CALC-SCNC: 6 MMOL/L — SIGNIFICANT CHANGE UP (ref 5–17)
AST SERPL-CCNC: 37 U/L — SIGNIFICANT CHANGE UP (ref 10–40)
BASOPHILS # BLD AUTO: 0.02 K/UL — SIGNIFICANT CHANGE UP (ref 0–0.2)
BASOPHILS NFR BLD AUTO: 0.2 % — SIGNIFICANT CHANGE UP (ref 0–2)
BILIRUB SERPL-MCNC: 0.5 MG/DL — SIGNIFICANT CHANGE UP (ref 0.2–1.2)
BUN SERPL-MCNC: 15 MG/DL — SIGNIFICANT CHANGE UP (ref 7–18)
CALCIUM SERPL-MCNC: 9.3 MG/DL — SIGNIFICANT CHANGE UP (ref 8.4–10.5)
CHLORIDE SERPL-SCNC: 101 MMOL/L — SIGNIFICANT CHANGE UP (ref 96–108)
CO2 SERPL-SCNC: 33 MMOL/L — HIGH (ref 22–31)
CREAT SERPL-MCNC: 0.74 MG/DL — SIGNIFICANT CHANGE UP (ref 0.5–1.3)
EGFR: 121 ML/MIN/1.73M2 — SIGNIFICANT CHANGE UP
EOSINOPHIL # BLD AUTO: 0.2 K/UL — SIGNIFICANT CHANGE UP (ref 0–0.5)
EOSINOPHIL NFR BLD AUTO: 1.9 % — SIGNIFICANT CHANGE UP (ref 0–6)
GLUCOSE BLDC GLUCOMTR-MCNC: 102 MG/DL — HIGH (ref 70–99)
GLUCOSE BLDC GLUCOMTR-MCNC: 110 MG/DL — HIGH (ref 70–99)
GLUCOSE BLDC GLUCOMTR-MCNC: 142 MG/DL — HIGH (ref 70–99)
GLUCOSE BLDC GLUCOMTR-MCNC: 160 MG/DL — HIGH (ref 70–99)
GLUCOSE SERPL-MCNC: 99 MG/DL — SIGNIFICANT CHANGE UP (ref 70–99)
HCT VFR BLD CALC: 44.2 % — SIGNIFICANT CHANGE UP (ref 39–50)
HGB BLD-MCNC: 13.8 G/DL — SIGNIFICANT CHANGE UP (ref 13–17)
IMM GRANULOCYTES NFR BLD AUTO: 0.7 % — SIGNIFICANT CHANGE UP (ref 0–1.5)
LYMPHOCYTES # BLD AUTO: 2.76 K/UL — SIGNIFICANT CHANGE UP (ref 1–3.3)
LYMPHOCYTES # BLD AUTO: 26.8 % — SIGNIFICANT CHANGE UP (ref 13–44)
MAGNESIUM SERPL-MCNC: 2.5 MG/DL — SIGNIFICANT CHANGE UP (ref 1.6–2.6)
MCHC RBC-ENTMCNC: 28.1 PG — SIGNIFICANT CHANGE UP (ref 27–34)
MCHC RBC-ENTMCNC: 31.2 GM/DL — LOW (ref 32–36)
MCV RBC AUTO: 90 FL — SIGNIFICANT CHANGE UP (ref 80–100)
MONOCYTES # BLD AUTO: 0.8 K/UL — SIGNIFICANT CHANGE UP (ref 0–0.9)
MONOCYTES NFR BLD AUTO: 7.8 % — SIGNIFICANT CHANGE UP (ref 2–14)
NEUTROPHILS # BLD AUTO: 6.45 K/UL — SIGNIFICANT CHANGE UP (ref 1.8–7.4)
NEUTROPHILS NFR BLD AUTO: 62.6 % — SIGNIFICANT CHANGE UP (ref 43–77)
NRBC # BLD: 0 /100 WBCS — SIGNIFICANT CHANGE UP (ref 0–0)
PHOSPHATE SERPL-MCNC: 4.8 MG/DL — HIGH (ref 2.5–4.5)
PLATELET # BLD AUTO: 234 K/UL — SIGNIFICANT CHANGE UP (ref 150–400)
POTASSIUM SERPL-MCNC: 4 MMOL/L — SIGNIFICANT CHANGE UP (ref 3.5–5.3)
POTASSIUM SERPL-SCNC: 4 MMOL/L — SIGNIFICANT CHANGE UP (ref 3.5–5.3)
PROT SERPL-MCNC: 7.2 G/DL — SIGNIFICANT CHANGE UP (ref 6–8.3)
RBC # BLD: 4.91 M/UL — SIGNIFICANT CHANGE UP (ref 4.2–5.8)
RBC # FLD: 15.6 % — HIGH (ref 10.3–14.5)
SODIUM SERPL-SCNC: 140 MMOL/L — SIGNIFICANT CHANGE UP (ref 135–145)
WBC # BLD: 10.3 K/UL — SIGNIFICANT CHANGE UP (ref 3.8–10.5)
WBC # FLD AUTO: 10.3 K/UL — SIGNIFICANT CHANGE UP (ref 3.8–10.5)

## 2022-07-04 PROCEDURE — 99233 SBSQ HOSP IP/OBS HIGH 50: CPT

## 2022-07-04 RX ORDER — MUPIROCIN 20 MG/G
1 OINTMENT TOPICAL
Refills: 0 | Status: DISCONTINUED | OUTPATIENT
Start: 2022-07-04 | End: 2022-07-04

## 2022-07-04 RX ORDER — MUPIROCIN 20 MG/G
1 OINTMENT TOPICAL
Refills: 0 | Status: DISCONTINUED | OUTPATIENT
Start: 2022-07-04 | End: 2022-07-06

## 2022-07-04 RX ORDER — CHLORHEXIDINE GLUCONATE 213 G/1000ML
1 SOLUTION TOPICAL
Refills: 0 | Status: DISCONTINUED | OUTPATIENT
Start: 2022-07-04 | End: 2022-07-06

## 2022-07-04 RX ADMIN — CHLORHEXIDINE GLUCONATE 1 APPLICATION(S): 213 SOLUTION TOPICAL at 18:32

## 2022-07-04 RX ADMIN — Medication 40 MILLIGRAM(S): at 06:13

## 2022-07-04 RX ADMIN — Medication 100 MILLIGRAM(S): at 06:14

## 2022-07-04 RX ADMIN — ALBUTEROL 2 PUFF(S): 90 AEROSOL, METERED ORAL at 14:36

## 2022-07-04 RX ADMIN — ALBUTEROL 2 PUFF(S): 90 AEROSOL, METERED ORAL at 22:32

## 2022-07-04 RX ADMIN — Medication 1: at 17:12

## 2022-07-04 RX ADMIN — ENOXAPARIN SODIUM 60 MILLIGRAM(S): 100 INJECTION SUBCUTANEOUS at 06:14

## 2022-07-04 RX ADMIN — ENOXAPARIN SODIUM 60 MILLIGRAM(S): 100 INJECTION SUBCUTANEOUS at 17:12

## 2022-07-04 RX ADMIN — ALBUTEROL 2 PUFF(S): 90 AEROSOL, METERED ORAL at 09:59

## 2022-07-04 RX ADMIN — Medication 100 MILLIGRAM(S): at 14:36

## 2022-07-04 RX ADMIN — PANTOPRAZOLE SODIUM 40 MILLIGRAM(S): 20 TABLET, DELAYED RELEASE ORAL at 06:13

## 2022-07-04 RX ADMIN — Medication 100 MILLIGRAM(S): at 22:33

## 2022-07-04 RX ADMIN — Medication 200 MILLIGRAM(S): at 22:32

## 2022-07-04 RX ADMIN — MUPIROCIN 1 APPLICATION(S): 20 OINTMENT TOPICAL at 18:31

## 2022-07-04 NOTE — DISCHARGE NOTE PROVIDER - HOSPITAL COURSE
36M from home with PMH of morbid obesity and PMH of TAL (awaiting CPAP) and DM, who  presented  with SOB x5 days. Pt returned to the ED  with cough and sob. Pt with increased sputum production. Pt was at [facility] 6/26 and left AMA. Pt with bouts of coughing and difficulty breathing, and sputum with streaks of blood. He endorses nausea. He denies fever, chills, vomiting, chest pain, abdominal pain, urinary or bowel movement changes.     Patient ABG  after admission showed worsening of hypercapnia, and patient was transferred to ICU for Bipap due to acute hypoxic and hypercapnic respiratory failure 2/2 to Human Metapneumovirus. ABG improved after initiation of bipap. Outpatient records indicate patient fitted for BIPAP for TAL, settings  22/18. Patient tolerating 14/6 while in ICU, continuing this settings for now at night. H. Metapneumovirus being treated with Duoneb, prednisone for course of 5 days, Robitussin. Keep HOB elevated at all times due to body habitus.   Pt needs BIPAP with oxygen to be delivered to home at discharge.   Isolation for hMPV x 5 days completed.       Pt improving on BIPAP . Continue BIPAP qHS and supplemental oxygen during the day.     Pt is medically optimized for discharge. Continue medications as instructed. Follow-up with PCP. Follow-up outpatient with Pulmonologist Dr. Montes .         This is brief summary of patient's hospitalization. Refer to medical record for complete details of hospital course.   36M from home with PMH of morbid obesity and PMH of TAL (awaiting CPAP) and DM, who  presented  with SOB x5 days. Pt returned to the ED  with cough and sob. Pt with increased sputum production. Pt was at [facility] 6/26 and left AMA. Pt with bouts of coughing and difficulty breathing, and sputum with streaks of blood. He endorses nausea. He denies fever, chills, vomiting, chest pain, abdominal pain, urinary or bowel movement changes.     Patient ABG  after admission showed worsening of hypercapnia, and patient was transferred to ICU for Bipap due to acute hypoxic and hypercapnic respiratory failure 2/2 to Human Metapneumovirus. ABG improved after initiation of bipap. Outpatient records indicate patient fitted for BIPAP for TAL, settings  22/18. Patient tolerating 14/6 while in ICU, continuing this settings for now at night. H. Metapneumovirus being treated with Duoneb, prednisone for course of 5 days, Robitussin. Keep HOB elevated at all times due to body habitus.   Pt needs BIPAP with oxygen to be delivered to home at discharge.   Isolation for hMPV x 5 days completed.       Pt improving on BIPAP . Continue BIPAP IPAP 14/EPAP 6 with O2 3L qHS and supplemental oxygen 3L NC during the day.     Pt is medically optimized for discharge. Continue medications as instructed. Follow-up with PCP. Follow-up outpatient with Pulmonologist Dr. Montes in 2 weeks.         This is brief summary of patient's hospitalization. Refer to medical record for complete details of hospital course.   36M from home with PMH of morbid obesity and PMH of TAL (awaiting CPAP) and DM, who  presented  with SOB x5 days. Pt returned to the ED  with cough and sob. Pt with increased sputum production. Pt was at [facility] 6/26 and left AMA. Pt with bouts of coughing and difficulty breathing, and sputum with streaks of blood. He endorses nausea. He denies fever, chills, vomiting, chest pain, abdominal pain, urinary or bowel movement changes.     Patient ABG  after admission showed worsening of hypercapnia, and patient was transferred to ICU for Bipap due to acute hypoxic and hypercapnic respiratory failure 2/2 to Human Metapneumovirus. ABG improved after initiation of bipap. Outpatient records indicate patient fitted for BIPAP for TAL, settings  22/18. Patient tolerating 14/6 while in ICU, continuing this settings for now at night. H. Metapneumovirus being treated with Duoneb, prednisone for course of 5 days, Robitussin. Keep HOB elevated at all times due to body habitus.   Pt needs BIPAP with oxygen to be delivered to home at discharge.   Isolation for hMPV x 5 days completed.       Pt improving on BIPAP qHS and supplemental oxygen. SpO2 95% on 3L NC at rest, decreases to 92 % on O2 3L NC  with ambulation . Pt tolerated activity. Continue BIPAP IPAP 14/EPAP 6 with O2 3L qHS and supplemental oxygen 3L NC during the day.     Pt is medically optimized for discharge. Continue medications as instructed. Follow-up with PCP. Follow-up outpatient with Pulmonologist Dr. Montes in 2 weeks.         This is brief summary of patient's hospitalization. Refer to medical record for complete details of hospital course.

## 2022-07-04 NOTE — PROGRESS NOTE ADULT - SUBJECTIVE AND OBJECTIVE BOX
[facility] Medicine    Patient is a 36y old  Male who presents with a chief complaint of AHRF secondary to metapneumovirus infection (04 Jul 2022 13:32)      SUBJECTIVE / OVERNIGHT EVENTS: No acute events overnight. Patient reports ongoing productive cough but think that it is improving. Denies SOB, CP, palpitations. Wants to go home when medically feasible.     MEDICATIONS  (STANDING):  ALBUTerol    90 MICROgram(s) HFA Inhaler 2 Puff(s) Inhalation every 6 hours  ALBUTerol    90 MICROgram(s) HFA Inhaler 2 Puff(s) Inhalation every 6 hours  benzonatate 100 milliGRAM(s) Oral three times a day  chlorhexidine 2% Cloths 1 Application(s) Topical <User Schedule>  enoxaparin Injectable 60 milliGRAM(s) SubCutaneous every 12 hours  insulin lispro (ADMELOG) corrective regimen sliding scale   SubCutaneous Before meals and at bedtime  mupirocin 2% Ointment 1 Application(s) Both Nostrils two times a day  pantoprazole    Tablet 40 milliGRAM(s) Oral before breakfast  senna 2 Tablet(s) Oral at bedtime    MEDICATIONS  (PRN):  acetaminophen     Tablet .. 650 milliGRAM(s) Oral every 6 hours PRN Temp greater or equal to 38C (100.4F), Moderate Pain (4 - 6)  guaiFENesin Oral Liquid (Sugar-Free) 200 milliGRAM(s) Oral every 6 hours PRN Cough  polyethylene glycol 3350 17 Gram(s) Oral daily PRN Constipation      Vital Signs Last 24 Hrs  T(C): 36.2 (07-04-22 @ 05:00)  T(F): 97.2 (07-04-22 @ 05:00), Max: 97.4 (07-03-22 @ 21:26)  HR: 74 (07-04-22 @ 08:06) (70 - 74)  BP: 127/70 (07-04-22 @ 05:00)  BP(mean): --  RR: 18 (07-04-22 @ 05:00) (18 - 18)  SpO2: 95% (07-04-22 @ 08:06) (93% - 97%)  Wt(kg): --    CAPILLARY BLOOD GLUCOSE      POCT Blood Glucose.: 142 mg/dL (04 Jul 2022 12:14)  POCT Blood Glucose.: 110 mg/dL (04 Jul 2022 07:52)  POCT Blood Glucose.: 110 mg/dL (03 Jul 2022 20:32)  POCT Blood Glucose.: 103 mg/dL (03 Jul 2022 16:21)    I&O's Summary      PHYSICAL EXAM:  GENERAL: NAD, well-developed  HEAD:  Atraumatic, Normocephalic  EYES: EOMI, PERRLA, conjunctiva and sclera clear  NECK: Supple, No JVD  CHEST/LUNG: Clear to auscultation bilaterally; No wheeze  HEART: Regular rate and rhythm; No murmurs, rubs, or gallops  ABDOMEN: Soft, Nontender, Nondistended; Bowel sounds present  EXTREMITIES:  2+ Peripheral Pulses, No clubbing, cyanosis, or edema  PSYCH: AAOx3  NEUROLOGY: non-focal  SKIN: No rashes or lesions    LABS:                        13.8   10.30 )-----------( 234      ( 04 Jul 2022 07:37 )             44.2     07-04    140  |  101  |  15  ----------------------------<  99  4.0   |  33<H>  |  0.74    Ca    9.3      04 Jul 2022 07:37  Phos  4.8     07-04  Mg     2.5     07-04    TPro  7.2  /  Alb  2.5<L>  /  TBili  0.5  /  DBili  x   /  AST  37  /  ALT  66<H>  /  AlkPhos  58  07-04              RADIOLOGY & ADDITIONAL TESTS:    Imaging Personally Reviewed:    Consultant(s) Notes Reviewed:      Care Discussed with Consultants/Other Providers:    Assessment and Plan: [facility] Medicine    Patient is a 36y old  Male who presents with a chief complaint of AHRF secondary to metapneumovirus infection (04 Jul 2022 13:32)      SUBJECTIVE / OVERNIGHT EVENTS: No acute events overnight. Patient reports ongoing productive cough but think that it is improving. Denies SOB, CP, palpitations. Wants to go home when medically feasible. Wore BIPAP overnight.    MEDICATIONS  (STANDING):  ALBUTerol    90 MICROgram(s) HFA Inhaler 2 Puff(s) Inhalation every 6 hours  ALBUTerol    90 MICROgram(s) HFA Inhaler 2 Puff(s) Inhalation every 6 hours  benzonatate 100 milliGRAM(s) Oral three times a day  chlorhexidine 2% Cloths 1 Application(s) Topical <User Schedule>  enoxaparin Injectable 60 milliGRAM(s) SubCutaneous every 12 hours  insulin lispro (ADMELOG) corrective regimen sliding scale   SubCutaneous Before meals and at bedtime  mupirocin 2% Ointment 1 Application(s) Both Nostrils two times a day  pantoprazole    Tablet 40 milliGRAM(s) Oral before breakfast  senna 2 Tablet(s) Oral at bedtime    MEDICATIONS  (PRN):  acetaminophen     Tablet .. 650 milliGRAM(s) Oral every 6 hours PRN Temp greater or equal to 38C (100.4F), Moderate Pain (4 - 6)  guaiFENesin Oral Liquid (Sugar-Free) 200 milliGRAM(s) Oral every 6 hours PRN Cough  polyethylene glycol 3350 17 Gram(s) Oral daily PRN Constipation      Vital Signs Last 24 Hrs  T(C): 36.2 (07-04-22 @ 05:00)  T(F): 97.2 (07-04-22 @ 05:00), Max: 97.4 (07-03-22 @ 21:26)  HR: 74 (07-04-22 @ 08:06) (70 - 74)  BP: 127/70 (07-04-22 @ 05:00)  BP(mean): --  RR: 18 (07-04-22 @ 05:00) (18 - 18)  SpO2: 95% (07-04-22 @ 08:06) (93% - 97%)  Wt(kg): --    CAPILLARY BLOOD GLUCOSE      POCT Blood Glucose.: 142 mg/dL (04 Jul 2022 12:14)  POCT Blood Glucose.: 110 mg/dL (04 Jul 2022 07:52)  POCT Blood Glucose.: 110 mg/dL (03 Jul 2022 20:32)  POCT Blood Glucose.: 103 mg/dL (03 Jul 2022 16:21)    I&O's Summary      PHYSICAL EXAM:  GENERAL: NAD, well-developed  HEAD:  Atraumatic, Normocephalic  EYES:  conjunctiva and sclera clear  NECK: Enlarged neck circumference  CHEST/LUNG: Decreased BS 2/2 habitus; No wheeze, rhonchi, rales  HEART: Regular rate and rhythm; No murmurs, rubs, or gallops  ABDOMEN: Soft, Nontender, Nondistended; Bowel sounds present  EXTREMITIES:  2+ Peripheral Pulses, No clubbing, cyanosis, trace LE edema  PSYCH: AAOx3, pleasant, cooperative  NEUROLOGY: non-focal  SKIN: No rashes or lesions    LABS:                        13.8   10.30 )-----------( 234      ( 04 Jul 2022 07:37 )             44.2     07-04    140  |  101  |  15  ----------------------------<  99  4.0   |  33<H>  |  0.74    Ca    9.3      04 Jul 2022 07:37  Phos  4.8     07-04  Mg     2.5     07-04    TPro  7.2  /  Alb  2.5<L>  /  TBili  0.5  /  DBili  x   /  AST  37  /  ALT  66<H>  /  AlkPhos  58  07-04              RADIOLOGY & ADDITIONAL TESTS:    Imaging Personally Reviewed:    Consultant(s) Notes Reviewed:      Care Discussed with Consultants/Other Providers: Dr. Montes (pul) re: alberto planning, do walk test when patient tolerating 3L NC    Assessment and Plan:

## 2022-07-04 NOTE — DISCHARGE NOTE PROVIDER - NSDCCPCAREPLAN_GEN_ALL_CORE_FT
PRINCIPAL DISCHARGE DIAGNOSIS  Diagnosis: Acute on chronic respiratory failure with hypoxia and hypercapnia  Assessment and Plan of Treatment: due to human metapneumonovirus.  Your carbon dioxide level was high , and oxygen level as low.   Acute condition  resolved .   Continue with BIPAP every night.  Supplemental oxygen during the day.   Continue inhalers, bronchodilator.   Maintain head of bed elevated due to your body habitus.   Follow-up with your primary care physician and Pulmonologist  within 1 -2 weeks. Call for appointment.  Report to your doctor or seek immediate medical attention if you develop any changes in your condition and or worsening signs/symptoms (such as and not limited :change in mental status,  shortness of breath, cough, fever).        SECONDARY DISCHARGE DIAGNOSES  Diagnosis: TAL (obstructive sleep apnea)  Assessment and Plan of Treatment: You completed  outpatient sleep study which indicated TAL.   You were also found to have low oxygen levels (hypoxia ) during sleep study: Oxygen saturation dropped to 66%.  Continue BIPAP nightly and as needed during the day.  Continue supplemental oxygen during the day.   Follow-up with your primary Pulmonologist Dr. Montes as scheduled.       Diagnosis: Morbid obesity  Assessment and Plan of Treatment: Continue dietary and activity  instructions.   Follow-up with your primary care physician. Call for appointment.      Diagnosis: Asthma  Assessment and Plan of Treatment: Continue bronchodilator as prescribed .  Avoid triggers.   Follow-up with your primary care physician.    Diagnosis: DM (diabetes mellitus)  Assessment and Plan of Treatment: Your A1C is 8.5.   Continue your anti-diabetes medication as prescribed by your doctor.   Monitor glucose levels as instructed.   Follow-up with your primary care physician Call for appointment.       PRINCIPAL DISCHARGE DIAGNOSIS  Diagnosis: Acute on chronic respiratory failure with hypoxia and hypercapnia  Assessment and Plan of Treatment: due to human metapneumonovirus.  Your carbon dioxide level was high , and oxygen level as low.   Acute condition  resolved .   Continue with BIPAP every night.  Supplemental oxygen during the day.   Continue inhalers, bronchodilator.   Maintain head of bed elevated due to your body habitus.   Follow-up with your primary care physician and Pulmonologist  within 1 -2 weeks. Call for appointment.  Report to your doctor or seek immediate medical attention if you develop any changes in your condition and or worsening signs/symptoms (such as and not limited :change in mental status,  shortness of breath, cough, fever).        SECONDARY DISCHARGE DIAGNOSES  Diagnosis: Obesity hypoventilation syndrome  Assessment and Plan of Treatment: You completed  outpatient sleep study which indicated TAL.   You were also found to have low oxygen levels (hypoxia ) during sleep study: Oxygen saturation dropped to 66%.  Continue BIPAP nightly and as needed during the day.  Continue supplemental oxygen during the day.   Follow-up with your primary Pulmonologist Dr. Montes as scheduled.    Diagnosis: TAL (obstructive sleep apnea)  Assessment and Plan of Treatment: You completed  outpatient sleep study which indicated TAL.   You were also found to have low oxygen levels (hypoxia ) during sleep study: Oxygen saturation dropped to 66%.  Continue BIPAP nightly and as needed during the day.  Continue supplemental oxygen during the day.   Follow-up with your primary Pulmonologist Dr. Montes as scheduled.       Diagnosis: Morbid obesity  Assessment and Plan of Treatment: Continue dietary and activity  instructions.   Follow-up with your primary care physician. Call for appointment.      Diagnosis: DM (diabetes mellitus)  Assessment and Plan of Treatment: Your A1C is 8.5.   Continue your anti-diabetes medication as prescribed by your doctor.   Monitor glucose levels as instructed.   Follow-up with your primary care physician Call for appointment.      Diagnosis: Asthma  Assessment and Plan of Treatment: Continue bronchodilator as prescribed .  Avoid triggers.   Follow-up with your primary care physician.

## 2022-07-04 NOTE — PROGRESS NOTE ADULT - ASSESSMENT
36M from home w/ morbid obesity and PMH of TAL (awaiting CPAP) and DM, presents with SOB x5 days. Pt returned to the ed with cough and sob. Pt with increased sputum production. Pt was at [facility] 6/26 and left AMA. Pt with bouts of coughing and difficulty breathing during this time. Pt with streaks of blood. He endorses nausea. He denies fever, chills, vomiting, chest pain, abdominal pain, urinary or bowel movement changes.     Patient abg after admission showed worsening of hypercapnia, and patient was transferred to ICU for Bipap due to acute hypercapnic respiratory failure 2/2 to Human Metapneumovirus. ABG improved after initiation of bipap. Outpatient records indicate patient fitted for BIPAP for TAL, settings  22/18. Patient tolerating 14/6 while in ICU, continuing this settings for now at night. H. Metapneumovirus being treated with Duoneb, prednisone, Robitussin.

## 2022-07-04 NOTE — DISCHARGE NOTE PROVIDER - DETAILS OF MALNUTRITION DIAGNOSIS/DIAGNOSES
This patient has been assessed with a concern for Malnutrition and was treated during this hospitalization for the following Nutrition diagnosis/diagnoses:     -  06/30/2022: Morbid obesity (BMI > 40)

## 2022-07-04 NOTE — DISCHARGE NOTE PROVIDER - NSDCMRMEDTOKEN_GEN_ALL_CORE_FT
metFORMIN 1000 mg oral tablet: 1 tab(s) orally 2 times a day   acetaminophen 325 mg oral tablet: 2 tab(s) orally every 6 hours, As needed, Temp greater or equal to 38C (100.4F), Moderate Pain (4 - 6)  albuterol 90 mcg/inh inhalation aerosol: 2 puff(s) inhaled every 6 hours as needed for shortness of breath and or wheezing  benzonatate 100 mg oral capsule: 1 cap(s) orally 3 times a day  guaiFENesin 100 mg/5 mL oral liquid: 10 milliliter(s) orally every 6 hours, As needed, Cough  metFORMIN 1000 mg oral tablet: 1 tab(s) orally 2 times a day.  Follow-up with your doctor when okay to resume.   mupirocin 2% topical ointment: Apply topically to affected area 2 times a day   senna leaf extract oral tablet: 2 tab(s) orally once a day (at bedtime)

## 2022-07-04 NOTE — DISCHARGE NOTE PROVIDER - CARE PROVIDER_API CALL
Lu Montes)  Critical Care Medicine; Pulmonary [place] [address]  Phone: [phone]  Fax: [phone]  Follow Up Time: 1 week    Dr. Izzy Malik, PCP  Phone: [phone]  Fax: (   )    -  Follow Up Time: 1 week

## 2022-07-04 NOTE — DISCHARGE NOTE PROVIDER - PROVIDER TOKENS
PROVIDER:[TOKEN:[[id number]],FOLLOWUP:[1 week]],FREE:[LAST:[Dr. Izzy Malik],FIRST:[PCP],PHONE:[phone]],FAX:[(   )    -],FOLLOWUP:[1 week]]

## 2022-07-04 NOTE — PROGRESS NOTE ADULT - PROBLEM SELECTOR PLAN 3
-Continue oxygen support during the day and BIPAP nightly.  -Continue bronchodilators.  -Completed 5 day course of prednisone

## 2022-07-04 NOTE — DISCHARGE NOTE PROVIDER - NSDCCAREPROVSEEN_GEN_ALL_CORE_FT
Dory, Terrie Montes, Lu Quinones, Jagdish Martínez, Cindy Russo, Alison Causey, Hayley Downey, Carie Haro, Dolores Pierre, Misty Dory, Terrie Montes, Lu Quinones, Jagdish Martínez, Cindy Russo, Alison Causey, Hayley Downey, Carie Haro, Dolores Pierre, Misty Vences, Trisha PANG

## 2022-07-05 DIAGNOSIS — E66.2 MORBID (SEVERE) OBESITY WITH ALVEOLAR HYPOVENTILATION: ICD-10-CM

## 2022-07-05 DIAGNOSIS — Z29.9 ENCOUNTER FOR PROPHYLACTIC MEASURES, UNSPECIFIED: ICD-10-CM

## 2022-07-05 DIAGNOSIS — Z02.9 ENCOUNTER FOR ADMINISTRATIVE EXAMINATIONS, UNSPECIFIED: ICD-10-CM

## 2022-07-05 LAB
GLUCOSE BLDC GLUCOMTR-MCNC: 108 MG/DL — HIGH (ref 70–99)
GLUCOSE BLDC GLUCOMTR-MCNC: 112 MG/DL — HIGH (ref 70–99)
GLUCOSE BLDC GLUCOMTR-MCNC: 114 MG/DL — HIGH (ref 70–99)
GLUCOSE BLDC GLUCOMTR-MCNC: 117 MG/DL — HIGH (ref 70–99)
SARS-COV-2 RNA SPEC QL NAA+PROBE: SIGNIFICANT CHANGE UP

## 2022-07-05 PROCEDURE — 99232 SBSQ HOSP IP/OBS MODERATE 35: CPT

## 2022-07-05 RX ORDER — ENOXAPARIN SODIUM 100 MG/ML
40 INJECTION SUBCUTANEOUS EVERY 24 HOURS
Refills: 0 | Status: DISCONTINUED | OUTPATIENT
Start: 2022-07-05 | End: 2022-07-06

## 2022-07-05 RX ORDER — MUPIROCIN 20 MG/G
1 OINTMENT TOPICAL
Qty: 1 | Refills: 0
Start: 2022-07-05 | End: 2022-07-07

## 2022-07-05 RX ORDER — ACETAMINOPHEN 500 MG
2 TABLET ORAL
Qty: 0 | Refills: 0 | DISCHARGE
Start: 2022-07-05

## 2022-07-05 RX ORDER — SENNA PLUS 8.6 MG/1
2 TABLET ORAL
Qty: 0 | Refills: 0 | DISCHARGE
Start: 2022-07-05

## 2022-07-05 RX ORDER — ALBUTEROL 90 UG/1
2 AEROSOL, METERED ORAL
Qty: 1 | Refills: 0
Start: 2022-07-05 | End: 2022-08-03

## 2022-07-05 RX ORDER — METFORMIN HYDROCHLORIDE 850 MG/1
1 TABLET ORAL
Qty: 0 | Refills: 0 | DISCHARGE

## 2022-07-05 RX ADMIN — ALBUTEROL 2 PUFF(S): 90 AEROSOL, METERED ORAL at 22:09

## 2022-07-05 RX ADMIN — ENOXAPARIN SODIUM 60 MILLIGRAM(S): 100 INJECTION SUBCUTANEOUS at 06:36

## 2022-07-05 RX ADMIN — ALBUTEROL 2 PUFF(S): 90 AEROSOL, METERED ORAL at 16:20

## 2022-07-05 RX ADMIN — MUPIROCIN 1 APPLICATION(S): 20 OINTMENT TOPICAL at 17:58

## 2022-07-05 RX ADMIN — Medication 100 MILLIGRAM(S): at 22:09

## 2022-07-05 RX ADMIN — Medication 100 MILLIGRAM(S): at 06:37

## 2022-07-05 RX ADMIN — MUPIROCIN 1 APPLICATION(S): 20 OINTMENT TOPICAL at 06:37

## 2022-07-05 RX ADMIN — ALBUTEROL 2 PUFF(S): 90 AEROSOL, METERED ORAL at 09:12

## 2022-07-05 RX ADMIN — Medication 100 MILLIGRAM(S): at 14:23

## 2022-07-05 RX ADMIN — ENOXAPARIN SODIUM 40 MILLIGRAM(S): 100 INJECTION SUBCUTANEOUS at 17:58

## 2022-07-05 RX ADMIN — SENNA PLUS 2 TABLET(S): 8.6 TABLET ORAL at 22:10

## 2022-07-05 NOTE — PROGRESS NOTE ADULT - SUBJECTIVE AND OBJECTIVE BOX
NP Note discussed with  Primary Attending    Patient is a 36y old  Male who presents with a chief complaint of AHRF secondary to metapneumovirus infection (04 Jul 2022 14:26)    INTERVAL HPI/OVERNIGHT EVENTS: no new complaints.  Pt endorses feeling better, used BIPAP overnight.     MEDICATIONS  (STANDING):  ALBUTerol    90 MICROgram(s) HFA Inhaler 2 Puff(s) Inhalation every 6 hours  ALBUTerol    90 MICROgram(s) HFA Inhaler 2 Puff(s) Inhalation every 6 hours  benzonatate 100 milliGRAM(s) Oral three times a day  chlorhexidine 2% Cloths 1 Application(s) Topical <User Schedule>  enoxaparin Injectable 60 milliGRAM(s) SubCutaneous every 12 hours  insulin lispro (ADMELOG) corrective regimen sliding scale   SubCutaneous Before meals and at bedtime  mupirocin 2% Ointment 1 Application(s) Both Nostrils two times a day  senna 2 Tablet(s) Oral at bedtime    MEDICATIONS  (PRN):  acetaminophen     Tablet .. 650 milliGRAM(s) Oral every 6 hours PRN Temp greater or equal to 38C (100.4F), Moderate Pain (4 - 6)  guaiFENesin Oral Liquid (Sugar-Free) 200 milliGRAM(s) Oral every 6 hours PRN Cough  polyethylene glycol 3350 17 Gram(s) Oral daily PRN Constipation      __________________________________________________  REVIEW OF SYSTEMS:    Patient denies fever, chills, headache, cough, SOB, chest discomfort, N/V/D/C, abdominal discomfort, dysuria, urinary frequency/urgency, joint/muscle discomfort.    ________________________________________________  PHYSICAL EXAM    Vital Signs Last 24 Hrs  T(C): 36.8 (05 Jul 2022 04:54), Max: 36.8 (04 Jul 2022 21:37)  T(F): 98.2 (05 Jul 2022 04:54), Max: 98.3 (04 Jul 2022 21:37)  HR: 69 (05 Jul 2022 04:54) (69 - 84)  BP: 124/69 (05 Jul 2022 04:54) (124/69 - 130/76)  BP(mean): --  RR: 19 (05 Jul 2022 04:54) (18 - 19)  SpO2: 95% (05 Jul 2022 04:54) (94% - 96%)      Gen: NAD, obese  HEENT: PERRL, anicteric, no oral mucositis  Resp: Clear breath sounds on auscultation. No rales, no wheezing  CVS: S1S2 present, regular  GI: BS(+), Soft, ND, NT, obese  Extremities : BLE No edema or calf tenderness. PPP  Neuro: Awake/alert.  no new neuro deficits  Skin: warm,dry,no rash      _________________________________________________  LABS:                        13.8   10.30 )-----------( 234      ( 04 Jul 2022 07:37 )             44.2     07-04    140  |  101  |  15  ----------------------------<  99  4.0   |  33<H>  |  0.74    Ca    9.3      04 Jul 2022 07:37  Phos  4.8     07-04  Mg     2.5     07-04    TPro  7.2  /  Alb  2.5<L>  /  TBili  0.5  /  DBili  x   /  AST  37  /  ALT  66<H>  /  AlkPhos  58  07-04        CAPILLARY BLOOD GLUCOSE      POCT Blood Glucose.: 108 mg/dL (05 Jul 2022 08:16)  POCT Blood Glucose.: 102 mg/dL (04 Jul 2022 21:19)  POCT Blood Glucose.: 160 mg/dL (04 Jul 2022 16:32)  POCT Blood Glucose.: 142 mg/dL (04 Jul 2022 12:14)    COVID-19 PCR: NotDetec (05 Jul 2022 06:51)        RADIOLOGY & ADDITIONAL TESTS:    Imaging Personally Reviewed:  YES/NO      Consultant(s) Notes Reviewed:   YES/ No    Care Discussed with Consultants :     Plan of care was discussed with patient and /or primary care giver; all questions and concerns were addressed and care was aligned with patient's wishes.     NP Note discussed with  Primary Attending    Patient is a 36y old  Male who presents with a chief complaint of AHRF secondary to metapneumovirus infection (04 Jul 2022 14:26)    INTERVAL HPI/OVERNIGHT EVENTS: no new complaints.  Pt endorses feeling better, used BIPAP overnight.     MEDICATIONS  (STANDING):  ALBUTerol    90 MICROgram(s) HFA Inhaler 2 Puff(s) Inhalation every 6 hours  ALBUTerol    90 MICROgram(s) HFA Inhaler 2 Puff(s) Inhalation every 6 hours  benzonatate 100 milliGRAM(s) Oral three times a day  chlorhexidine 2% Cloths 1 Application(s) Topical <User Schedule>  enoxaparin Injectable 60 milliGRAM(s) SubCutaneous every 12 hours  insulin lispro (ADMELOG) corrective regimen sliding scale   SubCutaneous Before meals and at bedtime  mupirocin 2% Ointment 1 Application(s) Both Nostrils two times a day  senna 2 Tablet(s) Oral at bedtime    MEDICATIONS  (PRN):  acetaminophen     Tablet .. 650 milliGRAM(s) Oral every 6 hours PRN Temp greater or equal to 38C (100.4F), Moderate Pain (4 - 6)  guaiFENesin Oral Liquid (Sugar-Free) 200 milliGRAM(s) Oral every 6 hours PRN Cough  polyethylene glycol 3350 17 Gram(s) Oral daily PRN Constipation      __________________________________________________  REVIEW OF SYSTEMS:    Patient denies fever, chills, headache, cough, SOB, chest discomfort, N/V/D/C, abdominal discomfort, dysuria, urinary frequency/urgency, joint/muscle discomfort.    ________________________________________________  PHYSICAL EXAM    Vital Signs Last 24 Hrs  T(C): 36.8 (05 Jul 2022 04:54), Max: 36.8 (04 Jul 2022 21:37)  T(F): 98.2 (05 Jul 2022 04:54), Max: 98.3 (04 Jul 2022 21:37)  HR: 69 (05 Jul 2022 04:54) (69 - 84)  BP: 124/69 (05 Jul 2022 04:54) (124/69 - 130/76)  BP(mean): --  RR: 19 (05 Jul 2022 04:54) (18 - 19)  SpO2: 95% (05 Jul 2022 04:54) (94% - 96%)      Gen: NAD, obese  HEENT: PERRL, anicteric, no oral mucositis  Resp: Clear breath sounds on auscultation. No rales, no wheezing  CVS: S1S2 present, regular  GI: BS(+), Soft, ND, NT, obese  Extremities : BLE No edema or calf tenderness. PPP  Neuro: Awake/alert.  no new neuro deficits  Skin: warm,dry,no rash      _________________________________________________  LABS:                        13.8   10.30 )-----------( 234      ( 04 Jul 2022 07:37 )             44.2     07-04    140  |  101  |  15  ----------------------------<  99  4.0   |  33<H>  |  0.74    Ca    9.3      04 Jul 2022 07:37  Phos  4.8     07-04  Mg     2.5     07-04    TPro  7.2  /  Alb  2.5<L>  /  TBili  0.5  /  DBili  x   /  AST  37  /  ALT  66<H>  /  AlkPhos  58  07-04        CAPILLARY BLOOD GLUCOSE      POCT Blood Glucose.: 108 mg/dL (05 Jul 2022 08:16)  POCT Blood Glucose.: 102 mg/dL (04 Jul 2022 21:19)  POCT Blood Glucose.: 160 mg/dL (04 Jul 2022 16:32)  POCT Blood Glucose.: 142 mg/dL (04 Jul 2022 12:14)    COVID-19 PCR: NotDetec (05 Jul 2022 06:51)    Blood Gas Profile - Arterial (06.29.22 @ 09:49)   pH, Arterial: 7.32: TYPE:(C=Critical, N=Notification, A=Abnormal) C   TESTS: _pC02 82   DATE/TIME CALLED: _06/29/2022 09:52:05 EDT   CALLED TO: ERICA MALCOLM   READ BACK (2 Patient Identifiers)(Y/N): _Y   READ BACK VALUES (Y/N): _Y   CALLED BY: _NAEL PULIDO, RRT   pCO2, Arterial: 82 mmHg   pO2, Arterial: 54 mmHg   HCO3, Arterial: 42 mmol/L   Base Excess, Arterial: 12.3 mmol/L   Oxygen Saturation, Arterial: 86 %   FIO2, Arterial: 21% Blood Gas Profile - Arterial (06.30.22 @ 11:12)   pH, Arterial: 7.45   pCO2, Arterial: 60 mmHg   pO2, Arterial: 70 mmHg   HCO3, Arterial: 42 mmol/L   Base Excess, Arterial: 14.8 mmol/L   Oxygen Saturation, Arterial: 95 %   FIO2, Arterial: 40%   Blood Gas Comments Arterial: R radial nc 4L       RADIOLOGY & ADDITIONAL TESTS:    < from: Xray Chest 1 View- PORTABLE-Urgent (06.28.22 @ 09:38) >    IMPRESSION: Questionable vague patchy opacities throughout both lungs for   which multifocal pneumonia cannot be excluded.    < end of copied text >  < from: Xray Chest 1 View-PORTABLE IMMEDIATE (Xray Chest 1 View-PORTABLE IMMEDIATE .) (06.26.22 @ 16:15) >    IMPRESSION:  Clear lungs.    < end of copied text >        Consultant(s) Notes Reviewed:   YES/ No    Care Discussed with Consultants :     Plan of care was discussed with patient and /or primary care giver; all questions and concerns were addressed and care was aligned with patient's wishes.

## 2022-07-05 NOTE — PROGRESS NOTE ADULT - PROBLEM SELECTOR PLAN 9
Dispo :home with O2  Home BIPAP  and oxygen ordered.  CM following Dispo :home with O2 and BIPAP   Home BIPAP  and oxygen ordered.  CM following

## 2022-07-05 NOTE — PROGRESS NOTE ADULT - ASSESSMENT
36M from home w/ morbid obesity and PMH of TAL (awaiting CPAP) and DM, presents with SOB x5 days. Pt returned to the ed with cough and sob. Pt with increased sputum production. Pt was at [facility] 6/26 and left AMA. Pt with bouts of coughing and difficulty breathing during this time. Pt with streaks of blood. He endorses nausea. He denies fever, chills, vomiting, chest pain, abdominal pain, urinary or bowel movement changes.     Patient abg after admission showed worsening of hypercapnia, and patient was transferred to ICU for Bipap due to acute hypercapnic respiratory failure 2/2 to Human Metapneumovirus. ABG improved after initiation of bipap. Outpatient records indicate patient fitted for BIPAP for TAL, settings  22/18. Patient tolerating 14/6 while in ICU, continuing this settings for now at night. H. Metapneumovirus being treated with Duoneb, prednisone, Robitussin.

## 2022-07-05 NOTE — PROGRESS NOTE ADULT - PROBLEM SELECTOR PLAN 3
Continue BIPAP nightly and when sleeping during the day. Continue BIPAP nightly and when sleeping during the day.  Continue supplemental oxygen .   Keep HOB elevated at all times due to body habitus  Monitor oxygenation

## 2022-07-06 ENCOUNTER — TRANSCRIPTION ENCOUNTER (OUTPATIENT)
Age: 37
End: 2022-07-06

## 2022-07-06 VITALS
OXYGEN SATURATION: 94 % | DIASTOLIC BLOOD PRESSURE: 85 MMHG | TEMPERATURE: 97 F | HEART RATE: 77 BPM | RESPIRATION RATE: 18 BRPM | SYSTOLIC BLOOD PRESSURE: 128 MMHG

## 2022-07-06 LAB
ALBUMIN SERPL ELPH-MCNC: 2.5 G/DL — LOW (ref 3.5–5)
ALP SERPL-CCNC: 54 U/L — SIGNIFICANT CHANGE UP (ref 40–120)
ALT FLD-CCNC: 73 U/L DA — HIGH (ref 10–60)
ANION GAP SERPL CALC-SCNC: 8 MMOL/L — SIGNIFICANT CHANGE UP (ref 5–17)
AST SERPL-CCNC: 28 U/L — SIGNIFICANT CHANGE UP (ref 10–40)
BASOPHILS # BLD AUTO: 0.05 K/UL — SIGNIFICANT CHANGE UP (ref 0–0.2)
BASOPHILS NFR BLD AUTO: 0.4 % — SIGNIFICANT CHANGE UP (ref 0–2)
BILIRUB SERPL-MCNC: 0.4 MG/DL — SIGNIFICANT CHANGE UP (ref 0.2–1.2)
BUN SERPL-MCNC: 16 MG/DL — SIGNIFICANT CHANGE UP (ref 7–18)
CALCIUM SERPL-MCNC: 8.9 MG/DL — SIGNIFICANT CHANGE UP (ref 8.4–10.5)
CHLORIDE SERPL-SCNC: 102 MMOL/L — SIGNIFICANT CHANGE UP (ref 96–108)
CO2 SERPL-SCNC: 31 MMOL/L — SIGNIFICANT CHANGE UP (ref 22–31)
CREAT SERPL-MCNC: 0.74 MG/DL — SIGNIFICANT CHANGE UP (ref 0.5–1.3)
EGFR: 121 ML/MIN/1.73M2 — SIGNIFICANT CHANGE UP
EOSINOPHIL # BLD AUTO: 0.36 K/UL — SIGNIFICANT CHANGE UP (ref 0–0.5)
EOSINOPHIL NFR BLD AUTO: 3.1 % — SIGNIFICANT CHANGE UP (ref 0–6)
GLUCOSE BLDC GLUCOMTR-MCNC: 112 MG/DL — HIGH (ref 70–99)
GLUCOSE BLDC GLUCOMTR-MCNC: 96 MG/DL — SIGNIFICANT CHANGE UP (ref 70–99)
GLUCOSE SERPL-MCNC: 114 MG/DL — HIGH (ref 70–99)
HCT VFR BLD CALC: 44.6 % — SIGNIFICANT CHANGE UP (ref 39–50)
HGB BLD-MCNC: 14 G/DL — SIGNIFICANT CHANGE UP (ref 13–17)
IMM GRANULOCYTES NFR BLD AUTO: 0.5 % — SIGNIFICANT CHANGE UP (ref 0–1.5)
LYMPHOCYTES # BLD AUTO: 2.65 K/UL — SIGNIFICANT CHANGE UP (ref 1–3.3)
LYMPHOCYTES # BLD AUTO: 23 % — SIGNIFICANT CHANGE UP (ref 13–44)
MAGNESIUM SERPL-MCNC: 2.3 MG/DL — SIGNIFICANT CHANGE UP (ref 1.6–2.6)
MCHC RBC-ENTMCNC: 28.3 PG — SIGNIFICANT CHANGE UP (ref 27–34)
MCHC RBC-ENTMCNC: 31.4 GM/DL — LOW (ref 32–36)
MCV RBC AUTO: 90.3 FL — SIGNIFICANT CHANGE UP (ref 80–100)
MONOCYTES # BLD AUTO: 0.94 K/UL — HIGH (ref 0–0.9)
MONOCYTES NFR BLD AUTO: 8.2 % — SIGNIFICANT CHANGE UP (ref 2–14)
NEUTROPHILS # BLD AUTO: 7.47 K/UL — HIGH (ref 1.8–7.4)
NEUTROPHILS NFR BLD AUTO: 64.8 % — SIGNIFICANT CHANGE UP (ref 43–77)
NRBC # BLD: 0 /100 WBCS — SIGNIFICANT CHANGE UP (ref 0–0)
PHOSPHATE SERPL-MCNC: 4.3 MG/DL — SIGNIFICANT CHANGE UP (ref 2.5–4.5)
PLATELET # BLD AUTO: 212 K/UL — SIGNIFICANT CHANGE UP (ref 150–400)
POTASSIUM SERPL-MCNC: 3.9 MMOL/L — SIGNIFICANT CHANGE UP (ref 3.5–5.3)
POTASSIUM SERPL-SCNC: 3.9 MMOL/L — SIGNIFICANT CHANGE UP (ref 3.5–5.3)
PROT SERPL-MCNC: 7.4 G/DL — SIGNIFICANT CHANGE UP (ref 6–8.3)
RBC # BLD: 4.94 M/UL — SIGNIFICANT CHANGE UP (ref 4.2–5.8)
RBC # FLD: 15.3 % — HIGH (ref 10.3–14.5)
SODIUM SERPL-SCNC: 141 MMOL/L — SIGNIFICANT CHANGE UP (ref 135–145)
WBC # BLD: 11.53 K/UL — HIGH (ref 3.8–10.5)
WBC # FLD AUTO: 11.53 K/UL — HIGH (ref 3.8–10.5)

## 2022-07-06 PROCEDURE — 94640 AIRWAY INHALATION TREATMENT: CPT

## 2022-07-06 PROCEDURE — 71045 X-RAY EXAM CHEST 1 VIEW: CPT

## 2022-07-06 PROCEDURE — 85379 FIBRIN DEGRADATION QUANT: CPT

## 2022-07-06 PROCEDURE — 83880 ASSAY OF NATRIURETIC PEPTIDE: CPT

## 2022-07-06 PROCEDURE — 87640 STAPH A DNA AMP PROBE: CPT

## 2022-07-06 PROCEDURE — 85610 PROTHROMBIN TIME: CPT

## 2022-07-06 PROCEDURE — 82803 BLOOD GASES ANY COMBINATION: CPT

## 2022-07-06 PROCEDURE — 99285 EMERGENCY DEPT VISIT HI MDM: CPT

## 2022-07-06 PROCEDURE — 80053 COMPREHEN METABOLIC PANEL: CPT

## 2022-07-06 PROCEDURE — 87635 SARS-COV-2 COVID-19 AMP PRB: CPT

## 2022-07-06 PROCEDURE — 94760 N-INVAS EAR/PLS OXIMETRY 1: CPT

## 2022-07-06 PROCEDURE — 80048 BASIC METABOLIC PNL TOTAL CA: CPT

## 2022-07-06 PROCEDURE — 93005 ELECTROCARDIOGRAM TRACING: CPT

## 2022-07-06 PROCEDURE — 85025 COMPLETE CBC W/AUTO DIFF WBC: CPT

## 2022-07-06 PROCEDURE — 80061 LIPID PANEL: CPT

## 2022-07-06 PROCEDURE — 83036 HEMOGLOBIN GLYCOSYLATED A1C: CPT

## 2022-07-06 PROCEDURE — 94660 CPAP INITIATION&MGMT: CPT

## 2022-07-06 PROCEDURE — 87641 MR-STAPH DNA AMP PROBE: CPT

## 2022-07-06 PROCEDURE — 85730 THROMBOPLASTIN TIME PARTIAL: CPT

## 2022-07-06 PROCEDURE — 82962 GLUCOSE BLOOD TEST: CPT

## 2022-07-06 PROCEDURE — 84484 ASSAY OF TROPONIN QUANT: CPT

## 2022-07-06 PROCEDURE — 83735 ASSAY OF MAGNESIUM: CPT

## 2022-07-06 PROCEDURE — 93306 TTE W/DOPPLER COMPLETE: CPT

## 2022-07-06 PROCEDURE — 36415 COLL VENOUS BLD VENIPUNCTURE: CPT

## 2022-07-06 PROCEDURE — 84100 ASSAY OF PHOSPHORUS: CPT

## 2022-07-06 PROCEDURE — 99238 HOSP IP/OBS DSCHRG MGMT 30/<: CPT

## 2022-07-06 PROCEDURE — 96374 THER/PROPH/DIAG INJ IV PUSH: CPT

## 2022-07-06 RX ADMIN — ALBUTEROL 2 PUFF(S): 90 AEROSOL, METERED ORAL at 05:54

## 2022-07-06 RX ADMIN — CHLORHEXIDINE GLUCONATE 1 APPLICATION(S): 213 SOLUTION TOPICAL at 05:56

## 2022-07-06 RX ADMIN — ALBUTEROL 2 PUFF(S): 90 AEROSOL, METERED ORAL at 14:32

## 2022-07-06 RX ADMIN — MUPIROCIN 1 APPLICATION(S): 20 OINTMENT TOPICAL at 05:55

## 2022-07-06 RX ADMIN — Medication 100 MILLIGRAM(S): at 14:31

## 2022-07-06 RX ADMIN — ALBUTEROL 2 PUFF(S): 90 AEROSOL, METERED ORAL at 09:32

## 2022-07-06 RX ADMIN — Medication 100 MILLIGRAM(S): at 05:54

## 2022-07-06 NOTE — PROGRESS NOTE ADULT - PROBLEM SELECTOR PLAN 1
secondary to human metapneumovirus, asthma, morbid obesity, OHS/TAL.  PaO2 54 , SaO2 86% on RA per ABG on 6/29  Acute improving  Continue oxygen support, BIPAP nightly and when sleeping during the day.  Needs to have HOB elevated at all times because of body habitus.   Continue bronchodilators  Completed  5 day course of prednisone.   Monitor oxygen saturation . Tolerating O2 3L NC, SpO2 95% at rest.   F/u SpO2 during activity  Home BIPAP  to be delivered to pt's home prior to discharge.  Home O2 ordered.
Secondary human metapneumovirus, asthma, morbid obesity and TAL  Continue oxygen support. BIPAP nightly and when sleeping during the day.  Needs to have HOB elevated at all times because of body habitus.  Continue bronchodilators  Continue prednisone for total of 5 days.  Monitor oxygen saturation.  CXR as above.
-Secondary to human metapneumovirus, asthma, morbid obesity and TAL  -Continue oxygen support. BIPAP nightly and when sleeping during the day  -Needs to have HOB elevated at all times because of body habitus.  -Continue bronchodilators  -Completed 5 day course of prednisone  -Monitor oxygen saturation, will attempt to wean to 3L NC today  -Spoke with Dr. Montes, patient's BIPAP does not need to be delivered to the home prior to discharge but plan should exist for when patient will get the BIPAP. Will also need to likely go home with BIPAP with O2. Patient's BIPAP company will need to be called to know that he will require O2 with his machine
Secondary human metapneumovirus, asthma, morbid obesity and TAL  Continue oxygen support. BIPAP nightly and when sleeping during the day.  Needs to have HOB elevated at all times because of body habitus.  Continue bronchodilators  Continue prednisone for total of 5 days through July 4th  Monitor oxygen saturation.  CXR as above.
-Secondary to human metapneumovirus, asthma, morbid obesity and TAL  -Continue oxygen support. BIPAP nightly and when sleeping during the day  -Needs to have HOB elevated at all times because of body habitus.  -Continue bronchodilators  -Continue prednisone for total of 5 days through July 4th  -Monitor oxygen saturation, will attempt to wean to 4L NC today  -Spoke with Dr. Montes, patient's BIPAP does not need to be delivered to the home prior to discharge but plan should exist for when patient will get the BIPAP
secondary to human metapneumovirus, asthma, morbid obesity, OHS/TAL.  PaO2 54 , SaO2 86% on RA per ABG on 6/29  Acute improving  Continue oxygen support, BIPAP nightly and when sleeping during the day.  Needs to have HOB elevated at all times because of body habitus.   Continue bronchodilators  Completed  5 day course of prednisone.   Monitor oxygen saturation . Tolerating O2 3L NC, SpO2 95% at rest.   F/u SpO2 during activity  Home BIPAP  to be delivered to pt's home prior to discharge.  Home O2 ordered.

## 2022-07-06 NOTE — PROGRESS NOTE ADULT - NS ATTEND AMEND GEN_ALL_CORE FT
Patient is feeling better.  Requires supplemental oxygen.   Will discharge home, when oxygen is delivered.
Patient is feeling better.  Requires supplemental oxygen.   Will discharge home, when oxygen is delivered.  f/u Dr. Montes.

## 2022-07-06 NOTE — PROGRESS NOTE ADULT - REASON FOR ADMISSION
AHRF secondary to metapneumovirus infection

## 2022-07-06 NOTE — PROGRESS NOTE ADULT - PROBLEM SELECTOR PROBLEM 2
Acute bronchitis due to human metapneumovirus

## 2022-07-06 NOTE — PROGRESS NOTE ADULT - PROBLEM SELECTOR PLAN 2
Test positive on admission for hMPV  -Continue oxygen supplementation.  Currently on 4LPM but will attempt to wean to 3L. Will need oxygen testing before discharge when stable on 3L NC  -BIPAP nightly and as needed during the day  -Continue bronchodilators  -5 day course of prednisone completed  -Incentive spirometry hourly while awake.  -5 days of isolation for hMPV completed, can dc contact contact precautions
Completed 5 day course of prednisone.   Continue oxygen supplement to maintain SpO2 >92%.   Monitor oxygenation . Tolerating O2 3L NC , with SpO2 95% at rest.   Continue BIPAP nightly and when sleeping during the day.   Continue bronchodilators.   Incentive spirometer  Completed contact isolation for hMPV.
Tested positive on admission.  Continue oxygen supplementation.  Currently on 5LPM. Will need oxygen testing before discharge.  BIPAP nightly and as needed during the day.    Continue bronchodilators  Prednisone for a total of 5 days.  Incentive spirometry hourly while awake.  Needs total of 5 days of isolation.
Test positive on admission for hMPV  -Continue oxygen supplementation.  Currently on 5LPM but will attempt to wean to 4L. Will need oxygen testing before discharge.  -BIPAP nightly and as needed during the day  -Continue bronchodilators  -Prednisone for a total of 5 days through July 4, 2022  -Incentive spirometry hourly while awake.  -Needs total of 5 days of isolation.
Tested positive on admission.  Continue oxygen supplementation.  Currently on 5LPM. Will need oxygen testing before discharge.  BIPAP nightly and as needed during the day.    Continue bronchodilators  Prednisone for a total of 5 days.  Incentive spirometry hourly while awake.  Needs total of 5 days of isolation.
Completed 5 day course of prednisone.   Continue oxygen supplement to maintain SpO2 >92%.   Monitor oxygenation . Tolerating O2 3L NC , with SpO2 95% at rest.   Continue BIPAP nightly and when sleeping during the day.   Continue bronchodilators.   Incentive spirometer  Completed contact isolation for hMPV.

## 2022-07-06 NOTE — PROGRESS NOTE ADULT - PROBLEM SELECTOR PLAN 9
Dispo :home with O2 and BIPAP   Home BIPAP  and oxygen ordered, pending delivery  CM following Dispo: home with O2 and BIPAP   Home BIPAP and oxygen ordered, pending delivery  CM following  Patient to follow up regarding delivery and receipt home  SW working with patient regarding support services

## 2022-07-06 NOTE — PROGRESS NOTE ADULT - PROBLEM SELECTOR PLAN 4
Continue sliding scale coverage.  Uses metformin at home  HgA1c  8.5 on admission.  Monitor glucose.
BMI >65  PaO2 PaO2 54 , SaO2 86% on RA per ABG on 6/29.   Improved on BIPAP and supplemental oxygen.   Continue BIPAP nightly and when sleeping during the day.  Continue supplemental oxygen .   Keep HOB elevated at all times due to body habitus  Monitor oxygenation
BMI >65  PaO2 PaO2 54 , SaO2 86% on RA per ABG on 6/29.   Improved on BIPAP and supplemental oxygen.   Continue BIPAP nightly and when sleeping during the day.  Continue supplemental oxygen .   Keep HOB elevated at all times due to body habitus  Monitor oxygenation

## 2022-07-06 NOTE — PROGRESS NOTE ADULT - PROVIDER SPECIALTY LIST ADULT
Internal Medicine
Critical Care
Critical Care
Internal Medicine
Critical Care
Pulmonology
Internal Medicine
Internal Medicine
Critical Care
Internal Medicine

## 2022-07-06 NOTE — PROGRESS NOTE ADULT - ASSESSMENT
36M from home w/ morbid obesity and PMH of TAL (awaiting CPAP) and DM, presents with SOB x5 days. Pt returned to the ED with cough and sob associated with increased sputum production. Pt was at [facility] 6/26 and left AMA. Pt with bouts of coughing and difficulty breathing during this time. Pt with streaks of blood. He endorses nausea. He denies fever, chills, vomiting, chest pain, abdominal pain, urinary or bowel movement changes.     Patient abg after admission showed worsening of hypercapnia, and patient was transferred to ICU for Bipap due to acute hypercapnic respiratory failure 2/2 to Human Metapneumovirus. ABG improved after initiation of bipap. Outpatient records indicate patient fitted for BIPAP for TAL, settings  22/18. Patient tolerating 14/6 while in ICU, continuing this settings for now at night. H. Metapneumovirus being treated with Duoneb, prednisone, Robitussin.    36M from home w/ morbid obesity and PMH of TAL (awaiting CPAP on admission) and DM, presented with SOB x5 days with cough and increased sputum production. Pt was at [facility] 6/26 and left AMA. Pt with bouts of coughing and difficulty breathing during this time. Pt with streaks of blood. He endorses nausea. He denies fever, chills, vomiting, chest pain, abdominal pain, urinary or bowel movement changes.     ABG on  admission showed worsening of hypercapnia, and patient was transferred to ICU for BIPAP due to acute hypercapnic respiratory failure 2/2 to Human Metapneumovirus. ABG improved after initiation of BIPAP. Outpatient records indicate patient fitted for BIPAP for TAL, settings  22/18. Patient tolerating 14/6 while in ICU, continuing this settings for now at night. H. Metapneumovirus being treated with Duoneb, prednisone, Robitussin. Patient completed prednisone

## 2022-07-06 NOTE — PROGRESS NOTE ADULT - PROBLEM SELECTOR PROBLEM 4
Obesity hypoventilation syndrome
Obesity hypoventilation syndrome
DM (diabetes mellitus)

## 2022-07-06 NOTE — PROGRESS NOTE ADULT - PROBLEM SELECTOR PLAN 7
Continue oxygen supplement during the day and BIPAP nightly  Continue bronchodilators  Completed 5 day course of prednisone
DVT and GI prophylaxis.  Continue oxygen support. BIPAP nightly and as needed during the day.  Oxygen testing before discharge. BIPAP ordered by OP provider Dr Montes.  PT consult. OOB daily.  Nutrition consult.
Continue oxygen supplement during the day and BIPAP nightly  Continue bronchodilators  Completed 5 day course of prednisone

## 2022-07-06 NOTE — PROGRESS NOTE ADULT - PROBLEM SELECTOR PLAN 3
Continue BIPAP nightly and when sleeping during the day.  Continue supplemental oxygen .   Keep HOB elevated at all times due to body habitus  Monitor oxygenation

## 2022-07-06 NOTE — PROGRESS NOTE ADULT - PROBLEM SELECTOR PLAN 5
BMI > 65  Continue heart healthy diet  Dietician following BMI > 65  Continue heart healthy diet  Dietician following  Patient education provided regarding risks and benefits of diet/exercise.

## 2022-07-06 NOTE — PROGRESS NOTE ADULT - PROBLEM SELECTOR PROBLEM 7
Advance care planning
Asthma
Asthma
Advance care planning

## 2022-07-06 NOTE — PROGRESS NOTE ADULT - PROBLEM SELECTOR PROBLEM 6
TAL (obstructive sleep apnea)
DM (diabetes mellitus)
DM (diabetes mellitus)
TAL (obstructive sleep apnea)

## 2022-07-06 NOTE — PROGRESS NOTE ADULT - PROBLEM SELECTOR PLAN 6
Continue glucose monitoring and cover with Admelog corrective scale.
Had OP sleep study. +TAL rec BIPAP settings 22/18.  Change current BIPAP settings to 14/6  May benefit more from AVAPS which would be more comfortable to use.  Continue BIPAP nightly and as needed during the day.  +Hypoxia during sleep study: Oxygen saturation dropped to 66%  Monitor oxygen saturation.
Had OP sleep study. +TAL rec BIPAP settings 22/18.  Change current BIPAP settings to 14/6  May benefit more from AVAPS which would be more comfortable to use.  Continue BIPAP nightly and as needed during the day.  +Hypoxia during sleep study: Oxygen saturation dropped to 66%  Monitor oxygen saturation.
Had OP sleep study. +TAL rec BIPAP settings 22/18.  Change current BIPAP settings to 14/6  May benefit more from AVAPS which would be more comfortable to use.  Continue BIPAP nightly and as needed during the day.  +Hypoxia during sleep study: Oxygen saturation dropped to 66%  Monitor oxygen saturation  Will need to let BIPAP company know that patient requires O2 with his BIPAP machine as initial fitting did not call for home oxygen with the machine
Had OP sleep study. +TAL rec BIPAP settings 22/18.  Change current BIPAP settings to 14/6  May benefit more from AVAPS which would be more comfortable to use.  Continue BIPAP nightly and as needed during the day.  +Hypoxia during sleep study: Oxygen saturation dropped to 66%  Monitor oxygen saturation.
Continue glucose monitoring and cover with Admelog corrective scale.

## 2022-07-06 NOTE — PROGRESS NOTE ADULT - SUBJECTIVE AND OBJECTIVE BOX
NP Note discussed with  Primary Attending    HPI: Patient is a 36y old  Male who presents with a chief complaint of AHRF secondary to metapneumovirus infection (04 Jul 2022 14:26)    INTERVAL HPI/OVERNIGHT EVENTS: no new complaints. Pt endorses feeling better, used BIPAP overnight.     MEDICATIONS  (STANDING):  ALBUTerol    90 MICROgram(s) HFA Inhaler 2 Puff(s) Inhalation every 6 hours  benzonatate 100 milliGRAM(s) Oral three times a day  chlorhexidine 2% Cloths 1 Application(s) Topical <User Schedule>  enoxaparin Injectable 40 milliGRAM(s) SubCutaneous every 24 hours  insulin lispro (ADMELOG) corrective regimen sliding scale   SubCutaneous Before meals and at bedtime  mupirocin 2% Ointment 1 Application(s) Both Nostrils two times a day  senna 2 Tablet(s) Oral at bedtime    MEDICATIONS  (PRN):  acetaminophen     Tablet .. 650 milliGRAM(s) Oral every 6 hours PRN Temp greater or equal to 38C (100.4F), Moderate Pain (4 - 6)  guaiFENesin Oral Liquid (Sugar-Free) 200 milliGRAM(s) Oral every 6 hours PRN Cough  polyethylene glycol 3350 17 Gram(s) Oral daily PRN Constipation        __________________________________________________  REVIEW OF SYSTEMS:    Patient denies fever, chills, headache, cough, SOB, chest discomfort, N/V/D/C, abdominal discomfort, dysuria, urinary frequency/urgency, joint/muscle discomfort.    ________________________________________________  PHYSICAL EXAM    Vital Signs Last 24 Hrs  T(C): 36.8 (05 Jul 2022 04:54), Max: 36.8 (04 Jul 2022 21:37)  T(F): 98.2 (05 Jul 2022 04:54), Max: 98.3 (04 Jul 2022 21:37)  HR: 69 (05 Jul 2022 04:54) (69 - 84)  BP: 124/69 (05 Jul 2022 04:54) (124/69 - 130/76)  BP(mean): --  RR: 19 (05 Jul 2022 04:54) (18 - 19)  SpO2: 95% (05 Jul 2022 04:54) (94% - 96%)    Vital Signs Last 24 Hrs  T(C): 36.2 (06 Jul 2022 05:00), Max: 36.6 (05 Jul 2022 12:27)  T(F): 97.2 (06 Jul 2022 05:00), Max: 97.8 (05 Jul 2022 12:27)  HR: 77 (06 Jul 2022 05:00) (68 - 95)  BP: 125/78 (06 Jul 2022 05:00) (102/82 - 137/98)  BP(mean): --  RR: 18 (06 Jul 2022 05:00) (17 - 20)  SpO2: 96% (06 Jul 2022 05:00) (92% - 96%)  Gen: NAD, obese  HEENT: PERRL, anicteric, no oral mucositis  Resp: Clear breath sounds on auscultation. No rales, no wheezing  CVS: S1S2 present, regular  GI: BS(+), Soft, ND, NT, obese  Extremities : BLE No edema or calf tenderness. PPP  Neuro: Awake/alert.  no new neuro deficits  Skin: warm,dry,no rash      _________________________________________________  LABS:                          14.0   11.53 )-----------( 212      ( 06 Jul 2022 05:27 )             44.6                13.8   10.30 )-----------( 234      ( 04 Jul 2022 07:37 )             44.2       07-06    141  |  102  |  16  ----------------------------<  114<H>  3.9   |  31  |  0.74    Ca    8.9      06 Jul 2022 05:27  Phos  4.3     07-06  Mg     2.3     07-06    TPro  7.4  /  Alb  2.5<L>  /  TBili  0.4  /  DBili  x   /  AST  28  /  ALT  73<H>  /  AlkPhos  54  07-06      CAPILLARY BLOOD GLUCOSE  POCT Blood Glucose.: 112 mg/dL (07-05-22 @ 22:10)  POCT Blood Glucose.: 117 mg/dL (07-05-22 @ 16:38)  POCT Blood Glucose.: 114 mg/dL (07-05-22 @ 12:14)  POCT Blood Glucose.: 108 mg/dL (07-05-22 @ 08:16)      POCT Blood Glucose.: 108 mg/dL (05 Jul 2022 08:16)  POCT Blood Glucose.: 102 mg/dL (04 Jul 2022 21:19)  POCT Blood Glucose.: 160 mg/dL (04 Jul 2022 16:32)  POCT Blood Glucose.: 142 mg/dL (04 Jul 2022 12:14)    COVID-19 PCR: NotDetec (05 Jul 2022 06:51)    Blood Gas Profile - Arterial (06.29.22 @ 09:49)   pH, Arterial: 7.32: TYPE:(C=Critical, N=Notification, A=Abnormal) C   TESTS: _pC02 82   DATE/TIME CALLED: _06/29/2022 09:52:05 EDT   CALLED TO: _ERICA MCKEON   READ BACK (2 Patient Identifiers)(Y/N): _Y   READ BACK VALUES (Y/N): _Y   CALLED BY: _TESS NAEL, RRT   pCO2, Arterial: 82 mmHg   pO2, Arterial: 54 mmHg   HCO3, Arterial: 42 mmol/L   Base Excess, Arterial: 12.3 mmol/L   Oxygen Saturation, Arterial: 86 %   FIO2, Arterial: 21% Blood Gas Profile - Arterial (06.30.22 @ 11:12)   pH, Arterial: 7.45   pCO2, Arterial: 60 mmHg   pO2, Arterial: 70 mmHg   HCO3, Arterial: 42 mmol/L   Base Excess, Arterial: 14.8 mmol/L   Oxygen Saturation, Arterial: 95 %   FIO2, Arterial: 40%   Blood Gas Comments Arterial: R radial nc 4L       RADIOLOGY & ADDITIONAL TESTS:  < from: Xray Chest 1 View- PORTABLE-Urgent (06.28.22 @ 09:38) >  IMPRESSION: Questionable vague patchy opacities throughout both lungs for   which multifocal pneumonia cannot be excluded.  < end of copied text >  < from: Xray Chest 1 View-PORTABLE IMMEDIATE (Xray Chest 1 View-PORTABLE IMMEDIATE .) (06.26.22 @ 16:15) >  IMPRESSION:  Clear lungs.  < end of copied text >        Consultant(s) Notes Reviewed:   YES    Care Discussed with Consultants : YES    Plan of care was discussed with patient and /or primary care giver; all questions and concerns were addressed and care was aligned with patient's wishes.     NP Note discussed with  Primary Attending    HPI: Patient is a 36y old  Male who presents with a chief complaint of AHRF secondary to metapneumovirus infection (04 Jul 2022 14:26)    INTERVAL HPI/OVERNIGHT EVENTS: No acute events overnight. Patient reports using BIPAP at night. Awaiting medical supplies at home for discharge.    MEDICATIONS  (STANDING):  ALBUTerol    90 MICROgram(s) HFA Inhaler 2 Puff(s) Inhalation every 6 hours  benzonatate 100 milliGRAM(s) Oral three times a day  chlorhexidine 2% Cloths 1 Application(s) Topical <User Schedule>  enoxaparin Injectable 40 milliGRAM(s) SubCutaneous every 24 hours  insulin lispro (ADMELOG) corrective regimen sliding scale   SubCutaneous Before meals and at bedtime  mupirocin 2% Ointment 1 Application(s) Both Nostrils two times a day  senna 2 Tablet(s) Oral at bedtime    MEDICATIONS  (PRN):  acetaminophen     Tablet .. 650 milliGRAM(s) Oral every 6 hours PRN Temp greater or equal to 38C (100.4F), Moderate Pain (4 - 6)  guaiFENesin Oral Liquid (Sugar-Free) 200 milliGRAM(s) Oral every 6 hours PRN Cough  polyethylene glycol 3350 17 Gram(s) Oral daily PRN Constipation  __________________________________________________  REVIEW OF SYSTEMS:    Patient denies fever, chills, headache, cough, SOB, chest discomfort, N/V/D/C, abdominal discomfort, dysuria, urinary frequency/urgency, joint/muscle discomfort. All other ROS negative.     ________________________________________________  PHYSICAL EXAM     Vital Signs Last 24 Hrs  T(C): 36.2 (06 Jul 2022 05:00), Max: 36.6 (05 Jul 2022 12:27)  T(F): 97.2 (06 Jul 2022 05:00), Max: 97.8 (05 Jul 2022 12:27)  HR: 77 (06 Jul 2022 05:00) (68 - 95)  BP: 125/78 (06 Jul 2022 05:00) (102/82 - 137/98)  BP(mean): --  RR: 18 (06 Jul 2022 05:00) (17 - 20)  SpO2: 96% (06 Jul 2022 05:00) (92% - 96%)    Gen: NAD, obese  HEENT: PERRL, anicteric, no oral mucositis  Resp: Clear breath sounds on auscultation. No rales, no wheezing  CVS: S1S2 present, regular  GI: BS(+), Soft, ND, NT, obese  Extremities : BLE No edema or calf tenderness. Pulses present equal and bilaterally in all extremities  Neuro: Awake/alert.  no new neuro deficits  Skin: warm,dry,no rash      _________________________________________________  LABS:                          14.0   11.53 )-----------( 212      ( 06 Jul 2022 05:27 )             44.6                13.8   10.30 )-----------( 234      ( 04 Jul 2022 07:37 )             44.2       07-06    141  |  102  |  16  ----------------------------<  114<H>  3.9   |  31  |  0.74    Ca    8.9      06 Jul 2022 05:27  Phos  4.3     07-06  Mg     2.3     07-06    TPro  7.4  /  Alb  2.5<L>  /  TBili  0.4  /  DBili  x   /  AST  28  /  ALT  73<H>  /  AlkPhos  54  07-06      CAPILLARY BLOOD GLUCOSE  POCT Blood Glucose.: 112 mg/dL (07-05-22 @ 22:10)  POCT Blood Glucose.: 117 mg/dL (07-05-22 @ 16:38)  POCT Blood Glucose.: 114 mg/dL (07-05-22 @ 12:14)  POCT Blood Glucose.: 108 mg/dL (07-05-22 @ 08:16)      POCT Blood Glucose.: 108 mg/dL (05 Jul 2022 08:16)  POCT Blood Glucose.: 102 mg/dL (04 Jul 2022 21:19)  POCT Blood Glucose.: 160 mg/dL (04 Jul 2022 16:32)  POCT Blood Glucose.: 142 mg/dL (04 Jul 2022 12:14)    COVID-19 PCR: Not Detec (05 Jul 2022 06:51)    Blood Gas Profile - Arterial (06.29.22 @ 09:49)   pH, Arterial: 7.32: TYPE:(C=Critical, N=Notification, A=Abnormal) C   TESTS: _pC02 82   DATE/TIME CALLED: _06/29/2022 09:52:05 EDT   CALLED TO: _SERICA   READ BACK (2 Patient Identifiers)(Y/N): _Y   READ BACK VALUES (Y/N): _Y   CALLED BY: _NAEL PULIDO, RRT   pCO2, Arterial: 82 mmHg   pO2, Arterial: 54 mmHg   HCO3, Arterial: 42 mmol/L   Base Excess, Arterial: 12.3 mmol/L   Oxygen Saturation, Arterial: 86 %   FIO2, Arterial: 21% Blood Gas Profile - Arterial (06.30.22 @ 11:12)   pH, Arterial: 7.45   pCO2, Arterial: 60 mmHg   pO2, Arterial: 70 mmHg   HCO3, Arterial: 42 mmol/L   Base Excess, Arterial: 14.8 mmol/L   Oxygen Saturation, Arterial: 95 %   FIO2, Arterial: 40%   Blood Gas Comments Arterial: R radial nc 4L       RADIOLOGY & ADDITIONAL TESTS:  < from: Xray Chest 1 View- PORTABLE-Urgent (06.28.22 @ 09:38) >  IMPRESSION: Questionable vague patchy opacities throughout both lungs for   which multifocal pneumonia cannot be excluded.  < end of copied text >  < from: Xray Chest 1 View-PORTABLE IMMEDIATE (Xray Chest 1 View-PORTABLE IMMEDIATE .) (06.26.22 @ 16:15) >  IMPRESSION:  Clear lungs.  < end of copied text >        Consultant(s) Notes Reviewed:   YES    Care Discussed with Consultants : YES    Plan of care was discussed with patient and /or primary care giver; all questions and concerns were addressed and care was aligned with patient's wishes.

## 2022-07-06 NOTE — PROGRESS NOTE ADULT - NUTRITIONAL ASSESSMENT
This patient has been assessed with a concern for Malnutrition and has been determined to have a diagnosis/diagnoses of Morbid obesity (BMI > 40).    This patient is being managed with:   Diet Consistent Carbohydrate/No Snacks-  Entered: Jun 28 2022 12:51PM    
This patient has been assessed with a concern for Malnutrition and has been determined to have a diagnosis/diagnoses of Morbid obesity (BMI > 40).  BMI 66      This patient is being managed with:   Diet Consistent Carbohydrate/No Snacks-  Entered: Jun 28 2022 12:51PM
This patient has been assessed with a concern for Malnutrition and has been determined to have a diagnosis/diagnoses of Morbid obesity (BMI > 40).    This patient is being managed with:   Diet Consistent Carbohydrate/No Snacks-  Entered: Jun 28 2022 12:51PM    

## 2022-07-06 NOTE — DISCHARGE NOTE NURSING/CASE MANAGEMENT/SOCIAL WORK - PATIENT PORTAL LINK FT
You can access the FollowMyHealth Patient Portal offered by [facility] by registering at the following website: [email or web address]. By joining [facility]’s FollowMyHealth portal, you will also be able to view your health information using other applications (apps) compatible with our system.

## 2022-07-12 PROBLEM — G47.33 OBSTRUCTIVE SLEEP APNEA (ADULT) (PEDIATRIC): Chronic | Status: ACTIVE | Noted: 2022-06-28

## 2022-07-14 NOTE — PATIENT PROFILE ADULT - FUNCTIONAL ASSESSMENT - BASIC MOBILITY SCORE.
Show Topical Anesthesia Variable?: Yes Consent: The patient's consent was obtained including but not limited to risks of crusting, scabbing, blistering, scarring, darker or lighter pigmentary change, recurrence, incomplete removal and infection. Spray Paint Technique: No Medical Necessity Information: It is in your best interest to select a reason for this procedure from the list below. All of these items fulfill various CMS LCD requirements except the new and changing color options. Spray Paint Text: The liquid nitrogen was applied to the skin utilizing a spray paint frosting technique. Medical Necessity Clause: This procedure was medically necessary because the lesions that were treated were: Number Of Freeze-Thaw Cycles: 1 freeze-thaw cycle Detail Level: Simple Post-Care Instructions: I reviewed with the patient in detail post-care instructions. Patient is to wear sunprotection, and avoid picking at any of the treated lesions. Pt may apply Vaseline to crusted or scabbing areas. 18

## 2022-07-19 ENCOUNTER — APPOINTMENT (OUTPATIENT)
Dept: PULMONOLOGY | Facility: CLINIC | Age: 37
End: 2022-07-19

## 2022-07-19 VITALS
HEART RATE: 80 BPM | DIASTOLIC BLOOD PRESSURE: 77 MMHG | WEIGHT: 315 LBS | SYSTOLIC BLOOD PRESSURE: 137 MMHG | OXYGEN SATURATION: 95 % | HEIGHT: 69 IN | BODY MASS INDEX: 46.65 KG/M2

## 2022-07-19 VITALS — TEMPERATURE: 97.2 F

## 2022-07-19 PROCEDURE — 99214 OFFICE O/P EST MOD 30 MIN: CPT

## 2022-07-19 NOTE — REASON FOR VISIT
[Sleep Apnea] : sleep apnea [Hypersomnolence] : hypersomnolence [Shortness of Breath] : shortness of breath [Obesity] : obesity [Follow-Up - From Hospitalization] : a follow-up visit after a recent hospitalization

## 2022-07-21 NOTE — HISTORY OF PRESENT ILLNESS
[Former] : former [< 20 pack-years] : < 20 pack-years [Never] : never [TextBox_4] : Patient is a 35 y/o male with TAL (pending sleep study), COVID (2019, February 2020), Asthma (post COVID, as per PCP) and no prior surgical history who presented to the clinic for pre-operative risk assessment for Bariatric surgery. Patient recently hospitalized at [facility], admitted  to ICU for Bipap due to acute hypercapnic respiratory failure 2/2 Human [place] pneumovirus. ABG improved after initiation of bipap. Outpatient records indicate patient fitted for BIPAP for TAL, settings 22/18, but equipment delayed prior to hospitalization. Patient states that he received BIPAP equipment after being discharged, endorses improvement in sleep, dyspnea no longer using albuterol, and overall breathing. Patient was discharged on home o2, but has bee titrating at home now using 2L NC, with portable O2 use when out of the house. States that he has improvement in SOB on exertion with supplemental O2. \par  [YearQuit] : 2021

## 2022-07-21 NOTE — ASSESSMENT
[FreeTextEntry1] : 1) SOB s/p hospitalization for AH/HRF 2/2 human metapneumovirus - Discharged with supp O2, but titrating at home, and notes improvement with breathing since starting BIPAP. Saturation on RA at rest and on Exertion preformed in clinic 91% - 96%. \par Advised that supplement O2 is no longer needed, no indiction at this time.\par 2) Exercise intolerance (+) tachycardia on exertion (+) intermittent pedal edema, b/l - encourage exercise as tolerated to improve de-conditioning. \par 3) TAL - c/w BIPAP, initiated earlier this month and tolerating well. After 30-days of use will request compliance report. Advised to continue with supp O2 connected to BIPAP (discharged with supp O2 setting) until after PFTs are preformed. \par \par RTC in 6 weeks

## 2022-07-21 NOTE — PHYSICAL EXAM
[No Acute Distress] : no acute distress [Well Nourished] : well nourished [No Deformities] : no deformities [Normal Oropharynx] : normal oropharynx [Normal Appearance] : normal appearance [No Neck Mass] : no neck mass [Normal Rate/Rhythm] : normal rate/rhythm [Normal S1, S2] : normal s1, s2 [No Murmurs] : no murmurs [No Resp Distress] : no resp distress [Clear to Auscultation Bilaterally] : clear to auscultation bilaterally [No Abnormalities] : no abnormalities [Benign] : benign [Normal Gait] : normal gait [No Clubbing] : no clubbing [No Cyanosis] : no cyanosis [FROM] : FROM [1+ Pitting] : 1+ pitting [Normal Color/ Pigmentation] : normal color/ pigmentation [No Focal Deficits] : no focal deficits [Oriented x3] : oriented x3 [Normal Affect] : normal affect [TextBox_68] : on supplemental O2 3L NC on portable O2. On RA at rest on exertion saturated 91% - 96%.

## 2022-07-21 NOTE — REVIEW OF SYSTEMS
[SOB on Exertion] : sob on exertion [Edema] : edema [Negative] : Endocrine [Cough] : no cough [Chest Tightness] : no chest tightness [Frequent URIs] : no frequent URIs [Dyspnea] : no dyspnea [Pleuritic Pain] : no pleuritic pain [Wheezing] : no wheezing [A.M. Dry Mouth] : no a.m. dry mouth [Depression] : no depression [Anxiety] : no anxiety [TextBox_30] : SOB improving with BIPAP and supplemental O2 [TextBox_44] : intermittent, and improving with elevation

## 2022-08-02 NOTE — ED PROVIDER NOTE - OBJECTIVE STATEMENT
36 y.o. male with h/o NIDDM, last dose yest., c/o coughing for 4 days, whitish sputum, sob, wheezing, nausea, no fever, chills, vomiting, pt with good appetite. myalgia for past 2 days, runny nose-clear mucus.  Pt's vaccinated for COVID last yrs.  Pt took Advil, last dose yest. am, no sick contact Yes

## 2022-08-31 ENCOUNTER — APPOINTMENT (OUTPATIENT)
Dept: SURGERY | Facility: CLINIC | Age: 37
End: 2022-08-31

## 2022-09-12 ENCOUNTER — APPOINTMENT (OUTPATIENT)
Dept: PULMONOLOGY | Facility: CLINIC | Age: 37
End: 2022-09-12

## 2022-09-14 ENCOUNTER — APPOINTMENT (OUTPATIENT)
Dept: SURGERY | Facility: CLINIC | Age: 37
End: 2022-09-14

## 2022-09-14 VITALS
WEIGHT: 315 LBS | BODY MASS INDEX: 47.19 KG/M2 | SYSTOLIC BLOOD PRESSURE: 142 MMHG | DIASTOLIC BLOOD PRESSURE: 87 MMHG | HEART RATE: 93 BPM | HEIGHT: 68.5 IN

## 2022-09-14 DIAGNOSIS — E66.01 MORBID (SEVERE) OBESITY DUE TO EXCESS CALORIES: ICD-10-CM

## 2022-09-14 PROCEDURE — 99214 OFFICE O/P EST MOD 30 MIN: CPT

## 2022-09-14 NOTE — HISTORY OF PRESENT ILLNESS
[de-identified] :  Mr. SHAUNA SILVA  is here for monthly pre-operative follow-up  in preparation for bariatric surgery. He  is making good food choices, working on eating smaller portions and becoming more mindful, as he has been working a a dietician since his prior visit. Mr. SILVA  has made a concerted effort to eat more balanced and nutritionally sound meals, and to engage in some level of physical activity on a regular basis. He  continues to struggle with weight loss despite continuous concerted efforts since the prior visit. \par He remains interested in a Gastric Bypass \par Patient has completed the following evaluations: Cardiology- cleared , Pulmonary- s/p sleep study,  BIPAP was recommended,  GI- EGD showed  Esophageal hiatal hernia. Patient's pathology results were  consistent with Gastric mucosa, minimally inflamed with reactive features. Negative for H. pylori   Patient has also seen our bariatric program coordinator and nutritionist.  \par He has also consulted with a mental health provider.  \par   [de-identified] : patient was admitted to the hospital in June  with SOB . He has been following up with Dr. Montes from pulmonology, and will be seeing her tomorrow.

## 2022-09-14 NOTE — REVIEW OF SYSTEMS
[Negative] : Allergic/Immunologic [Patient Intake Form Reviewed] : Patient intake form was reviewed [Recent Change In Weight] : ~T recent weight change [SOB on Exertion] : shortness of breath during exertion [Fever] : no fever [Chills] : no chills [Fatigue] : no fatigue [Vision Problems] : no vision problems [Dysphagia] : no dysphagia [Odynophagia] : no odynophagia [Chest Pain] : no chest pain [Palpitations] : no palpitations [Shortness Of Breath] : no shortness of breath [Wheezing] : no wheezing [Cough] : no cough [Abdominal Pain] : no abdominal pain [Vomiting] : no vomiting [Constipation] : no constipation [Reflux/Heartburn] : no reflex/heartburn [Easy Bleeding] : no tendency for easy bleeding [Easy Bruising] : no tendency for easy bruising

## 2022-09-14 NOTE — PHYSICAL EXAM
[Obese, well nourished, in no acute distress] : obese, well nourished, in no acute distress [Normal] : affect appropriate [de-identified] : Obese, protuberant. Soft, nontender, nondistended, no rebound or guarding. [de-identified] : skin rash on forehead  No

## 2022-09-14 NOTE — ASSESSMENT
[FreeTextEntry1] : Patient remains an excellent candidate for weight loss surgery, given the presence/risk of developing or worsening of multiple obesity-related comorbidities, and remains committed to proceeding with weight loss surgery.\par \par Patient is in the process of obtaining the required pre-operative evaluations, which for this patient include:\par Nutrition\par Psych\par GI\par  Pulmonary \par PCP letter\par Weigh ins\par Abd US \par labs \par weight lose prior to surgery- Target weight  400 lbs

## 2022-09-15 ENCOUNTER — APPOINTMENT (OUTPATIENT)
Dept: PULMONOLOGY | Facility: CLINIC | Age: 37
End: 2022-09-15

## 2022-09-15 VITALS
RESPIRATION RATE: 16 BRPM | HEART RATE: 94 BPM | HEIGHT: 68.5 IN | SYSTOLIC BLOOD PRESSURE: 116 MMHG | BODY MASS INDEX: 47.19 KG/M2 | DIASTOLIC BLOOD PRESSURE: 79 MMHG | TEMPERATURE: 98.3 F | WEIGHT: 315 LBS | OXYGEN SATURATION: 81 %

## 2022-09-15 PROCEDURE — 99214 OFFICE O/P EST MOD 30 MIN: CPT

## 2022-09-15 NOTE — REASON FOR VISIT
[Follow-Up - From Hospitalization] : a follow-up visit after a recent hospitalization [Sleep Apnea] : sleep apnea [Hypersomnolence] : hypersomnolence [Shortness of Breath] : shortness of breath [Obesity] : obesity

## 2022-09-15 NOTE — HISTORY OF PRESENT ILLNESS
[Former] : former [< 20 pack-years] : < 20 pack-years [Never] : never [TextBox_4] : Patient is a 37 y/o male with TAL (pending sleep study), COVID (2019, February 2020), Asthma (post COVID, as per PCP) and no prior surgical history who presented to the clinic for pre-operative risk assessment for Bariatric surgery. Patient recently hospitalized at [facility], admitted  to ICU for BiPAP due to acute hypercapnic respiratory failure 2/2 Human [place] pneumovirus. ABG improved after initiation of BiPAP. Outpatient records indicate patient fitted for BIPAP for TAL, settings 22/18, but equipment delayed prior to hospitalization. Patient states that he received BIPAP equipment after being discharged, endorses improvement in sleep, dyspnea no longer using albuterol, and overall breathing. Patient was discharged on home o2, but has bee titrating at home now using 2L NC, with portable O2 use when out of the house. States that he has improvement in SOB on exertion with supplemental O2. \par \par Pt here today for follow up 9/15/2022\par Doing better overall.\par Losing weight.\par SOB has improved\par Attending gym regularly\par O2 only with BIPAP [YearQuit] : 2021

## 2022-09-15 NOTE — REVIEW OF SYSTEMS
[Cough] : no cough [Chest Tightness] : no chest tightness [Frequent URIs] : no frequent URIs [Dyspnea] : no dyspnea [Pleuritic Pain] : no pleuritic pain [Wheezing] : no wheezing [A.M. Dry Mouth] : no a.m. dry mouth [SOB on Exertion] : sob on exertion [Edema] : edema [Depression] : no depression [Anxiety] : no anxiety [Negative] : Endocrine [TextBox_30] : SOB improving with BIPAP and supplemental O2 [TextBox_44] : intermittent, and improving with elevation

## 2022-09-15 NOTE — ASSESSMENT
[FreeTextEntry1] : Recovered from viral pneumonitis\par Off O2\par Pulm function at baseline, optimized\par BIPAP therapy nightly with O2 to continue\par Request compliance report\par Otherwise pt is optimized from pulmonary standpoint to undergo elective bariatric surgery, recommend routine dylan and postop care and continue nightly BIPAP\par \par

## 2022-09-16 LAB
24R-OH-CALCIDIOL SERPL-MCNC: 71.4 PG/ML
ANION GAP SERPL CALC-SCNC: 14 MMOL/L
APTT BLD: 32 SEC
BASOPHILS # BLD AUTO: 0.05 K/UL
BASOPHILS NFR BLD AUTO: 0.4 %
BUN SERPL-MCNC: 11 MG/DL
CALCIUM SERPL-MCNC: 10 MG/DL
CALCIUM SERPL-MCNC: 10 MG/DL
CHLORIDE SERPL-SCNC: 101 MMOL/L
CHOLEST SERPL-MCNC: 200 MG/DL
CO2 SERPL-SCNC: 26 MMOL/L
CREAT SERPL-MCNC: 0.63 MG/DL
EGFR: 127 ML/MIN/1.73M2
EOSINOPHIL # BLD AUTO: 0.39 K/UL
EOSINOPHIL NFR BLD AUTO: 3.1 %
ESTIMATED AVERAGE GLUCOSE: 194 MG/DL
FOLATE SERPL-MCNC: 7.2 NG/ML
GLUCOSE SERPL-MCNC: 208 MG/DL
HBA1C MFR BLD HPLC: 8.4 %
HCT VFR BLD CALC: 45 %
HDLC SERPL-MCNC: 44 MG/DL
HGB BLD-MCNC: 14.2 G/DL
IMM GRANULOCYTES NFR BLD AUTO: 0.5 %
INR PPP: 0.97 RATIO
IRON SERPL-MCNC: 48 UG/DL
LDLC SERPL CALC-MCNC: 100 MG/DL
LYMPHOCYTES # BLD AUTO: 2.58 K/UL
LYMPHOCYTES NFR BLD AUTO: 20.3 %
MAGNESIUM SERPL-MCNC: 2 MG/DL
MAN DIFF?: NORMAL
MCHC RBC-ENTMCNC: 29.1 PG
MCHC RBC-ENTMCNC: 31.6 GM/DL
MCV RBC AUTO: 92.2 FL
MONOCYTES # BLD AUTO: 0.9 K/UL
MONOCYTES NFR BLD AUTO: 7.1 %
NEUTROPHILS # BLD AUTO: 8.7 K/UL
NEUTROPHILS NFR BLD AUTO: 68.6 %
NONHDLC SERPL-MCNC: 155 MG/DL
PARATHYROID HORMONE INTACT: 51 PG/ML
PLATELET # BLD AUTO: 291 K/UL
POTASSIUM SERPL-SCNC: 4.4 MMOL/L
PT BLD: 11.5 SEC
RBC # BLD: 4.88 M/UL
RBC # FLD: 15.4 %
SODIUM SERPL-SCNC: 141 MMOL/L
TRIGL SERPL-MCNC: 277 MG/DL
TSH SERPL-ACNC: 1.35 UIU/ML
VIT B12 SERPL-MCNC: 551 PG/ML
WBC # FLD AUTO: 12.68 K/UL

## 2024-08-22 NOTE — ED ADULT NURSE NOTE - NURSING NEURO ORIENTATION
oriented to person, place and time [HRA Reviewed] : Health risk assessment reviewed [Independent] : managing medications [Some assistance needed] : managing finances [Full assistance needed] : doing laundry [No falls in past year] : Patient reported no falls in the past year [No] : The patient does not have visual impairment

## (undated) DEVICE — MASK OXYGEN PANORAMIC

## (undated) DEVICE — BITE BLOCK SCOPE SAVER 20X27MM ADULT GREEN

## (undated) DEVICE — FORCEP RADIAL JAW 4 W NDL 2.2MM 2.8MM 240CM ORANGE DISP

## (undated) DEVICE — WRAP COMPRESSION CALF MED

## (undated) DEVICE — TUBING ENDO EXT OLYMPUS 160 24HR USE

## (undated) DEVICE — TUBING CANNULA SALTER LABS NASAL ADULT 7FT

## (undated) DEVICE — Device

## (undated) DEVICE — SOL INJ NS 0.9% 500ML 1-PORT

## (undated) DEVICE — BITE BLOCK MOUTHPCW/STRAP

## (undated) DEVICE — SOLIDIFIER ISOLYZER 2000 CC

## (undated) DEVICE — FORMALIN CONTAINER MED